# Patient Record
Sex: FEMALE | Race: OTHER | Employment: OTHER | URBAN - METROPOLITAN AREA
[De-identification: names, ages, dates, MRNs, and addresses within clinical notes are randomized per-mention and may not be internally consistent; named-entity substitution may affect disease eponyms.]

---
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
## 2021-01-05 ENCOUNTER — HOSPITAL ENCOUNTER (INPATIENT)
Age: 21
LOS: 1 days | Discharge: PSYCHIATRIC OR SUBSTANCE USE FACILITY | DRG: 817 | End: 2021-01-06
Attending: EMERGENCY MEDICINE | Admitting: INTERNAL MEDICINE
Payer: COMMERCIAL

## 2021-01-05 DIAGNOSIS — T14.91XA SUICIDE ATTEMPT (HCC): Primary | ICD-10-CM

## 2021-01-05 DIAGNOSIS — T43.222A: ICD-10-CM

## 2021-01-05 LAB
ACETAMINOPHEN LEVEL: <5 UG/ML (ref 10–30)
ALBUMIN SERPL-MCNC: 5 G/DL (ref 3.5–4.6)
ALP BLD-CCNC: 95 U/L (ref 40–130)
ALT SERPL-CCNC: 10 U/L (ref 0–33)
ANION GAP SERPL CALCULATED.3IONS-SCNC: 7 MEQ/L (ref 9–15)
APTT: 26.3 SEC (ref 24.4–36.8)
AST SERPL-CCNC: 16 U/L (ref 0–35)
BASOPHILS ABSOLUTE: 0 K/UL (ref 0–0.2)
BASOPHILS RELATIVE PERCENT: 0.5 %
BILIRUB SERPL-MCNC: 0.3 MG/DL (ref 0.2–0.7)
BUN BLDV-MCNC: 11 MG/DL (ref 6–20)
CALCIUM SERPL-MCNC: 9.2 MG/DL (ref 8.5–9.9)
CHLORIDE BLD-SCNC: 98 MEQ/L (ref 95–107)
CO2: 27 MEQ/L (ref 20–31)
CREAT SERPL-MCNC: 0.7 MG/DL (ref 0.5–0.9)
EOSINOPHILS ABSOLUTE: 0.5 K/UL (ref 0–0.7)
EOSINOPHILS RELATIVE PERCENT: 4.8 %
ETHANOL PERCENT: NORMAL G/DL
ETHANOL: <10 MG/DL (ref 0–0.08)
GFR AFRICAN AMERICAN: >60
GFR NON-AFRICAN AMERICAN: >60
GLOBULIN: 2.6 G/DL (ref 2.3–3.5)
GLUCOSE BLD-MCNC: 118 MG/DL (ref 70–99)
HCG QUALITATIVE: NEGATIVE
HCT VFR BLD CALC: 40.1 % (ref 37–47)
HEMOGLOBIN: 12.8 G/DL (ref 12–16)
INR BLD: 1.1
LYMPHOCYTES ABSOLUTE: 3.2 K/UL (ref 1–4.8)
LYMPHOCYTES RELATIVE PERCENT: 33 %
MAGNESIUM: 2 MG/DL (ref 1.7–2.4)
MCH RBC QN AUTO: 26 PG (ref 27–31.3)
MCHC RBC AUTO-ENTMCNC: 32 % (ref 33–37)
MCV RBC AUTO: 81 FL (ref 82–100)
MONOCYTES ABSOLUTE: 0.6 K/UL (ref 0.2–0.8)
MONOCYTES RELATIVE PERCENT: 6.6 %
NEUTROPHILS ABSOLUTE: 5.3 K/UL (ref 1.4–6.5)
NEUTROPHILS RELATIVE PERCENT: 55.1 %
PDW BLD-RTO: 13.3 % (ref 11.5–14.5)
PLATELET # BLD: 173 K/UL (ref 130–400)
POTASSIUM REFLEX MAGNESIUM: 3.9 MEQ/L (ref 3.4–4.9)
PROTHROMBIN TIME: 13.8 SEC (ref 12.3–14.9)
RBC # BLD: 4.94 M/UL (ref 4.2–5.4)
SALICYLATE, SERUM: <0.3 MG/DL (ref 15–30)
SARS-COV-2, NAAT: NOT DETECTED
SODIUM BLD-SCNC: 132 MEQ/L (ref 135–144)
TOTAL PROTEIN: 7.6 G/DL (ref 6.3–8)
TROPONIN: <0.01 NG/ML (ref 0–0.01)
WBC # BLD: 9.5 K/UL (ref 4.5–11)

## 2021-01-05 PROCEDURE — 2580000003 HC RX 258: Performed by: EMERGENCY MEDICINE

## 2021-01-05 PROCEDURE — 6360000002 HC RX W HCPCS: Performed by: EMERGENCY MEDICINE

## 2021-01-05 PROCEDURE — 85610 PROTHROMBIN TIME: CPT

## 2021-01-05 PROCEDURE — 83735 ASSAY OF MAGNESIUM: CPT

## 2021-01-05 PROCEDURE — 96374 THER/PROPH/DIAG INJ IV PUSH: CPT

## 2021-01-05 PROCEDURE — U0002 COVID-19 LAB TEST NON-CDC: HCPCS

## 2021-01-05 PROCEDURE — 84703 CHORIONIC GONADOTROPIN ASSAY: CPT

## 2021-01-05 PROCEDURE — G0480 DRUG TEST DEF 1-7 CLASSES: HCPCS

## 2021-01-05 PROCEDURE — 93005 ELECTROCARDIOGRAM TRACING: CPT | Performed by: EMERGENCY MEDICINE

## 2021-01-05 PROCEDURE — 84484 ASSAY OF TROPONIN QUANT: CPT

## 2021-01-05 PROCEDURE — 85730 THROMBOPLASTIN TIME PARTIAL: CPT

## 2021-01-05 PROCEDURE — 81001 URINALYSIS AUTO W/SCOPE: CPT

## 2021-01-05 PROCEDURE — 80307 DRUG TEST PRSMV CHEM ANLYZR: CPT

## 2021-01-05 PROCEDURE — 85025 COMPLETE CBC W/AUTO DIFF WBC: CPT

## 2021-01-05 PROCEDURE — 6370000000 HC RX 637 (ALT 250 FOR IP): Performed by: EMERGENCY MEDICINE

## 2021-01-05 PROCEDURE — 99285 EMERGENCY DEPT VISIT HI MDM: CPT

## 2021-01-05 PROCEDURE — 36415 COLL VENOUS BLD VENIPUNCTURE: CPT

## 2021-01-05 PROCEDURE — 80053 COMPREHEN METABOLIC PANEL: CPT

## 2021-01-05 RX ORDER — 0.9 % SODIUM CHLORIDE 0.9 %
1000 INTRAVENOUS SOLUTION INTRAVENOUS ONCE
Status: COMPLETED | OUTPATIENT
Start: 2021-01-05 | End: 2021-01-06

## 2021-01-05 RX ORDER — ONDANSETRON 2 MG/ML
4 INJECTION INTRAMUSCULAR; INTRAVENOUS ONCE
Status: COMPLETED | OUTPATIENT
Start: 2021-01-05 | End: 2021-01-05

## 2021-01-05 RX ADMIN — ONDANSETRON 4 MG: 2 INJECTION INTRAMUSCULAR; INTRAVENOUS at 23:08

## 2021-01-05 RX ADMIN — ACTIVATED CHARCOAL 50 G: 208 SUSPENSION ORAL at 23:08

## 2021-01-05 RX ADMIN — SODIUM CHLORIDE 1000 ML: 9 INJECTION, SOLUTION INTRAVENOUS at 23:07

## 2021-01-06 ENCOUNTER — HOSPITAL ENCOUNTER (INPATIENT)
Age: 21
LOS: 6 days | Discharge: HOME | DRG: 751 | End: 2021-01-12
Attending: PSYCHIATRY & NEUROLOGY | Admitting: PSYCHIATRY & NEUROLOGY
Payer: COMMERCIAL

## 2021-01-06 VITALS
RESPIRATION RATE: 18 BRPM | WEIGHT: 91.4 LBS | OXYGEN SATURATION: 99 % | DIASTOLIC BLOOD PRESSURE: 62 MMHG | HEART RATE: 83 BPM | BODY MASS INDEX: 16.2 KG/M2 | HEIGHT: 63 IN | SYSTOLIC BLOOD PRESSURE: 95 MMHG | TEMPERATURE: 98.2 F

## 2021-01-06 PROBLEM — F32.9 MAJOR DEPRESSIVE DISORDER WITHOUT PSYCHOTIC FEATURES: Status: ACTIVE | Noted: 2021-01-06

## 2021-01-06 PROBLEM — T14.91XA SUICIDE ATTEMPT (HCC): Status: ACTIVE | Noted: 2021-01-06

## 2021-01-06 PROBLEM — T43.221A: Status: ACTIVE | Noted: 2021-01-06

## 2021-01-06 LAB
ALBUMIN SERPL-MCNC: 3.6 G/DL (ref 3.5–4.6)
ALP BLD-CCNC: 69 U/L (ref 40–130)
ALT SERPL-CCNC: 16 U/L (ref 0–33)
AMPHETAMINE SCREEN, URINE: NORMAL
ANION GAP SERPL CALCULATED.3IONS-SCNC: 11 MEQ/L (ref 9–15)
AST SERPL-CCNC: 23 U/L (ref 0–35)
BACTERIA: NEGATIVE /HPF
BARBITURATE SCREEN URINE: NORMAL
BENZODIAZEPINE SCREEN, URINE: NORMAL
BILIRUB SERPL-MCNC: 0.3 MG/DL (ref 0.2–0.7)
BILIRUBIN URINE: NEGATIVE
BLOOD, URINE: NEGATIVE
BUN BLDV-MCNC: 7 MG/DL (ref 6–20)
CALCIUM SERPL-MCNC: 8.4 MG/DL (ref 8.5–9.9)
CANNABINOID SCREEN URINE: NORMAL
CHLORIDE BLD-SCNC: 108 MEQ/L (ref 95–107)
CLARITY: CLEAR
CO2: 23 MEQ/L (ref 20–31)
COCAINE METABOLITE SCREEN URINE: NORMAL
COLOR: YELLOW
CREAT SERPL-MCNC: 0.65 MG/DL (ref 0.5–0.9)
EKG ATRIAL RATE: 79 BPM
EKG ATRIAL RATE: 81 BPM
EKG ATRIAL RATE: 86 BPM
EKG P AXIS: 136 DEGREES
EKG P AXIS: 60 DEGREES
EKG P AXIS: 65 DEGREES
EKG P-R INTERVAL: 152 MS
EKG P-R INTERVAL: 152 MS
EKG P-R INTERVAL: 158 MS
EKG Q-T INTERVAL: 370 MS
EKG Q-T INTERVAL: 374 MS
EKG Q-T INTERVAL: 378 MS
EKG QRS DURATION: 72 MS
EKG QRS DURATION: 74 MS
EKG QRS DURATION: 78 MS
EKG QTC CALCULATION (BAZETT): 424 MS
EKG QTC CALCULATION (BAZETT): 439 MS
EKG QTC CALCULATION (BAZETT): 447 MS
EKG R AXIS: 141 DEGREES
EKG R AXIS: 49 DEGREES
EKG R AXIS: 59 DEGREES
EKG T AXIS: 147 DEGREES
EKG T AXIS: 40 DEGREES
EKG T AXIS: 51 DEGREES
EKG VENTRICULAR RATE: 79 BPM
EKG VENTRICULAR RATE: 81 BPM
EKG VENTRICULAR RATE: 86 BPM
EPITHELIAL CELLS, UA: NORMAL /HPF (ref 0–5)
GFR AFRICAN AMERICAN: >60
GFR NON-AFRICAN AMERICAN: >60
GLOBULIN: 2.3 G/DL (ref 2.3–3.5)
GLUCOSE BLD-MCNC: 100 MG/DL (ref 70–99)
GLUCOSE URINE: NEGATIVE MG/DL
HCG, URINE, POC: NEGATIVE
HYALINE CASTS: NORMAL /HPF (ref 0–5)
KETONES, URINE: NEGATIVE MG/DL
LEUKOCYTE ESTERASE, URINE: ABNORMAL
Lab: NORMAL
Lab: NORMAL
METHADONE SCREEN, URINE: NORMAL
NEGATIVE QC PASS/FAIL: NORMAL
NITRITE, URINE: NEGATIVE
OPIATE SCREEN URINE: NORMAL
OXYCODONE URINE: NORMAL
PH UA: 5.5 (ref 5–9)
PHENCYCLIDINE SCREEN URINE: NORMAL
POSITIVE QC PASS/FAIL: NORMAL
POTASSIUM REFLEX MAGNESIUM: 3.9 MEQ/L (ref 3.4–4.9)
PROPOXYPHENE SCREEN: NORMAL
PROTEIN UA: NEGATIVE MG/DL
RBC UA: NORMAL /HPF (ref 0–5)
SODIUM BLD-SCNC: 142 MEQ/L (ref 135–144)
SPECIFIC GRAVITY UA: 1.01 (ref 1–1.03)
TOTAL CK: 83 U/L (ref 0–170)
TOTAL PROTEIN: 5.9 G/DL (ref 6.3–8)
UROBILINOGEN, URINE: 0.2 E.U./DL
WBC UA: NORMAL /HPF (ref 0–5)

## 2021-01-06 PROCEDURE — 1240000000 HC EMOTIONAL WELLNESS R&B

## 2021-01-06 PROCEDURE — 2580000003 HC RX 258: Performed by: NURSE PRACTITIONER

## 2021-01-06 PROCEDURE — 80053 COMPREHEN METABOLIC PANEL: CPT

## 2021-01-06 PROCEDURE — 82550 ASSAY OF CK (CPK): CPT

## 2021-01-06 PROCEDURE — 36415 COLL VENOUS BLD VENIPUNCTURE: CPT

## 2021-01-06 PROCEDURE — 6360000002 HC RX W HCPCS: Performed by: NURSE PRACTITIONER

## 2021-01-06 PROCEDURE — 1210000000 HC MED SURG R&B

## 2021-01-06 PROCEDURE — 90792 PSYCH DIAG EVAL W/MED SRVCS: CPT | Performed by: PSYCHIATRY & NEUROLOGY

## 2021-01-06 PROCEDURE — 93005 ELECTROCARDIOGRAM TRACING: CPT | Performed by: NURSE PRACTITIONER

## 2021-01-06 RX ORDER — ACETAMINOPHEN 325 MG/1
650 TABLET ORAL EVERY 6 HOURS PRN
Status: DISCONTINUED | OUTPATIENT
Start: 2021-01-06 | End: 2021-01-06 | Stop reason: HOSPADM

## 2021-01-06 RX ORDER — MAGNESIUM HYDROXIDE/ALUMINUM HYDROXICE/SIMETHICONE 120; 1200; 1200 MG/30ML; MG/30ML; MG/30ML
30 SUSPENSION ORAL PRN
Status: DISCONTINUED | OUTPATIENT
Start: 2021-01-06 | End: 2021-01-12 | Stop reason: HOSPADM

## 2021-01-06 RX ORDER — HALOPERIDOL 5 MG
5 TABLET ORAL EVERY 4 HOURS PRN
Status: DISCONTINUED | OUTPATIENT
Start: 2021-01-06 | End: 2021-01-12 | Stop reason: HOSPADM

## 2021-01-06 RX ORDER — ONDANSETRON 2 MG/ML
4 INJECTION INTRAMUSCULAR; INTRAVENOUS EVERY 6 HOURS PRN
Status: DISCONTINUED | OUTPATIENT
Start: 2021-01-06 | End: 2021-01-06 | Stop reason: HOSPADM

## 2021-01-06 RX ORDER — TRAZODONE HYDROCHLORIDE 50 MG/1
50 TABLET ORAL NIGHTLY PRN
Status: DISCONTINUED | OUTPATIENT
Start: 2021-01-06 | End: 2021-01-12 | Stop reason: HOSPADM

## 2021-01-06 RX ORDER — PROMETHAZINE HYDROCHLORIDE 12.5 MG/1
12.5 TABLET ORAL EVERY 6 HOURS PRN
Status: DISCONTINUED | OUTPATIENT
Start: 2021-01-06 | End: 2021-01-06 | Stop reason: HOSPADM

## 2021-01-06 RX ORDER — PROMETHAZINE HYDROCHLORIDE 12.5 MG/1
12.5 TABLET ORAL EVERY 6 HOURS PRN
Status: CANCELLED | OUTPATIENT
Start: 2021-01-06

## 2021-01-06 RX ORDER — SODIUM CHLORIDE 0.9 % (FLUSH) 0.9 %
10 SYRINGE (ML) INJECTION PRN
Status: DISCONTINUED | OUTPATIENT
Start: 2021-01-06 | End: 2021-01-06 | Stop reason: HOSPADM

## 2021-01-06 RX ORDER — ACETAMINOPHEN 650 MG/1
650 SUPPOSITORY RECTAL EVERY 6 HOURS PRN
Status: CANCELLED | OUTPATIENT
Start: 2021-01-06

## 2021-01-06 RX ORDER — ONDANSETRON 2 MG/ML
4 INJECTION INTRAMUSCULAR; INTRAVENOUS EVERY 6 HOURS PRN
Status: CANCELLED | OUTPATIENT
Start: 2021-01-06

## 2021-01-06 RX ORDER — SODIUM CHLORIDE 0.9 % (FLUSH) 0.9 %
10 SYRINGE (ML) INJECTION PRN
Status: CANCELLED | OUTPATIENT
Start: 2021-01-06

## 2021-01-06 RX ORDER — SODIUM CHLORIDE 0.9 % (FLUSH) 0.9 %
10 SYRINGE (ML) INJECTION EVERY 12 HOURS SCHEDULED
Status: CANCELLED | OUTPATIENT
Start: 2021-01-06

## 2021-01-06 RX ORDER — ACETAMINOPHEN 650 MG/1
650 SUPPOSITORY RECTAL EVERY 6 HOURS PRN
Status: DISCONTINUED | OUTPATIENT
Start: 2021-01-06 | End: 2021-01-06 | Stop reason: HOSPADM

## 2021-01-06 RX ORDER — HYDROXYZINE HYDROCHLORIDE 25 MG/1
50 TABLET, FILM COATED ORAL 3 TIMES DAILY PRN
Status: DISCONTINUED | OUTPATIENT
Start: 2021-01-06 | End: 2021-01-12 | Stop reason: HOSPADM

## 2021-01-06 RX ORDER — ACETAMINOPHEN 325 MG/1
650 TABLET ORAL EVERY 4 HOURS PRN
Status: DISCONTINUED | OUTPATIENT
Start: 2021-01-06 | End: 2021-01-12 | Stop reason: HOSPADM

## 2021-01-06 RX ORDER — BENZTROPINE MESYLATE 1 MG/ML
2 INJECTION INTRAMUSCULAR; INTRAVENOUS 2 TIMES DAILY PRN
Status: DISCONTINUED | OUTPATIENT
Start: 2021-01-06 | End: 2021-01-12 | Stop reason: HOSPADM

## 2021-01-06 RX ORDER — HALOPERIDOL 5 MG/ML
5 INJECTION INTRAMUSCULAR EVERY 4 HOURS PRN
Status: DISCONTINUED | OUTPATIENT
Start: 2021-01-06 | End: 2021-01-12 | Stop reason: HOSPADM

## 2021-01-06 RX ORDER — ACETAMINOPHEN 325 MG/1
650 TABLET ORAL EVERY 6 HOURS PRN
Status: CANCELLED | OUTPATIENT
Start: 2021-01-06

## 2021-01-06 RX ORDER — SODIUM CHLORIDE 0.9 % (FLUSH) 0.9 %
10 SYRINGE (ML) INJECTION EVERY 12 HOURS SCHEDULED
Status: DISCONTINUED | OUTPATIENT
Start: 2021-01-06 | End: 2021-01-06 | Stop reason: HOSPADM

## 2021-01-06 RX ADMIN — ENOXAPARIN SODIUM 40 MG: 40 INJECTION SUBCUTANEOUS at 10:54

## 2021-01-06 RX ADMIN — Medication 10 ML: at 10:55

## 2021-01-06 ASSESSMENT — ENCOUNTER SYMPTOMS
ABDOMINAL DISTENTION: 0
TROUBLE SWALLOWING: 0
NAUSEA: 0
VOMITING: 0
COUGH: 0
SHORTNESS OF BREATH: 0
SINUS PAIN: 0
SORE THROAT: 0
WHEEZING: 0
ABDOMINAL PAIN: 0
DIARRHEA: 0

## 2021-01-06 ASSESSMENT — SLEEP AND FATIGUE QUESTIONNAIRES
AVERAGE NUMBER OF SLEEP HOURS: 7
RESTFUL SLEEP: NO
DO YOU USE A SLEEP AID: NO

## 2021-01-06 ASSESSMENT — PATIENT HEALTH QUESTIONNAIRE - PHQ9: SUM OF ALL RESPONSES TO PHQ QUESTIONS 1-9: 20

## 2021-01-06 NOTE — PROGRESS NOTES
Physician Progress Note    2021   12:32 PM    Name:  Soila Gonzalez  MRN:    [id number]      Day: 0     Admit Date: 2021 10:40 PM  PCP: Trixie Lagunas MD    Code Status:  Full Code      Assessment and Plan: Active Problems/ diagnosis:     Suicide attempt  Fluoxetine overdose  History of depression    Plan    -Monitor on telemetry today  -Psychiatry to evaluate  -Resume current meds  -Resume one-to-one supervision and suicide precautions  -Anticipate patient will be medically cleared tomorrow morning    DVT PPx     Subjective:     No new symptoms.     Physical Examination:      Vitals:  BP 92/60   Pulse 80   Temp 98.4 °F (36.9 °C) (Oral)   Resp 18   Ht 5' 3\" (1.6 m)   Wt 91 lb 6.4 oz (41.5 kg)   LMP 12/15/2020   SpO2 98%   BMI 16.19 kg/m²   Temp (24hrs), Av °F (36.7 °C), Min:97.5 °F (36.4 °C), Max:98.4 °F (36.9 °C)      General appearance: alert, cooperative and no distress  Mental Status: oriented to person, place and time and normal affect  Lungs: clear to auscultation bilaterally, normal effort  Heart: regular rate and rhythm, no murmur  Abdomen: soft, nontender, nondistended, bowel sounds present, no masses  Extremities: no edema, redness, tenderness in the calves  Skin: no gross lesions, rashes    Data:     Labs:  Recent Labs     21  2300   WBC 9.5   HGB 12.8        Recent Labs     21  2300 21  0450   * 142   K 3.9 3.9   CL 98 108*   CO2 27 23   BUN 11 7   CREATININE 0.70 0.65   GLUCOSE 118* 100*     Recent Labs     21  2300 21  0450   AST 16 23   ALT 10 16   BILITOT 0.3 0.3   ALKPHOS 95 69       Current Facility-Administered Medications   Medication Dose Route Frequency Provider Last Rate Last Admin    sodium chloride flush 0.9 % injection 10 mL  10 mL Intravenous 2 times per day AUDI Combs - CNP   10 mL at 21 1055    sodium chloride flush 0.9 % injection 10 mL  10 mL Intravenous PRN Emely Riddlews, APRN - CNP        enoxaparin (LOVENOX) injection 40 mg  40 mg Subcutaneous Daily Cleophus Reason, APRN - CNP   40 mg at 01/06/21 1054    promethazine (PHENERGAN) tablet 12.5 mg  12.5 mg Oral Q6H PRN Cleophus Reason, APRN - CNP        Or    ondansetron [facility]) injection 4 mg  4 mg Intravenous Q6H PRN Cleophus Reason, APRN - CNP        acetaminophen (TYLENOL) tablet 650 mg  650 mg Oral Q6H PRN Cleophus Reason, APRN - CNP        Or    acetaminophen (TYLENOL) suppository 650 mg  650 mg Rectal Q6H PRN Cleophus Reason, APRN - CNP           Additional work up or/and treatment plan may be added today or then after based on clinical progression. I am managing a portion of pt care. Some medical issues are handled by other specialists. Additional work up and treatment should be done in out pt setting by pt PCP and other out pt providers. In addition to examining and evaluating pt, I spent additional time explaining care, normaland abnormal findings, and treatment plan. All of pt questions were answered. Counseling, diet and education were provided. Case will be discussed with nursing staff when appropriate. Family will be updated if and when appropriate.        Electronically signed by Josh Healy DO on 1/6/2021 at 12:32 PM

## 2021-01-06 NOTE — H&P
[facility]  MEDICINE    HISTORY AND PHYSICAL EXAM    PATIENT NAME:  Kevin Valdes    MRN:  [id number]  SERVICE DATE:  1/6/2021   SERVICE TIME:  12:50 AM    Primary Care Physician: Kathy Maddox MD         SUBJECTIVE  CHIEF COMPLAINT:  Suicide attempt     HPI:  This is a 21 y.o. female w no signiicant medical history except depression/anxiety  who presents with after taking handful of fluoxetine. She states she and her boyfriend broke up, so she took the pills in an attempt to kill herself. She denies HA, blurred vision, fever, N/V or abdominal pain. She is alert, oriented. She denies seizure, palpitations, trouble breathing,     PAST MEDICAL HISTORY:    Past Medical History:   Diagnosis Date    Anxiety     Depression      PAST SURGICAL HISTORY:  History reviewed. No pertinent surgical history. FAMILY HISTORY:  History reviewed. No pertinent family history.   SOCIAL HISTORY:    Social History     Socioeconomic History    Marital status: Single     Spouse name: Not on file    Number of children: Not on file    Years of education: Not on file    Highest education level: Not on file   Occupational History    Not on file   Social Needs    Financial resource strain: Not on file    Food insecurity     Worry: Not on file     Inability: Not on file    Transportation needs     Medical: Not on file     Non-medical: Not on file   Tobacco Use    Smoking status: Never Smoker    Smokeless tobacco: Never Used   Substance and Sexual Activity    Alcohol use: No    Drug use: No    Sexual activity: Not on file   Lifestyle    Physical activity     Days per week: Not on file     Minutes per session: Not on file    Stress: Not on file   Relationships    Social connections     Talks on phone: Not on file     Gets together: Not on file     Attends Mosque service: Not on file     Active member of club or organization: Not on file     Attends meetings of clubs or organizations: Not on file nervous/anxious. OBJECTIVE  PHYSICAL EXAM: BP (!) 95/54   Pulse 96   Temp 97.5 °F (36.4 °C) (Oral)   Resp 14   Wt 90 lb (40.8 kg)   LMP 12/15/2020   SpO2 98%   BMI 15.94 kg/m²     Physical Exam  Constitutional:       General: She is not in acute distress. Appearance: She is normal weight. She is not ill-appearing. HENT:      Head: Normocephalic. Nose: Nose normal.      Mouth/Throat:      Mouth: Mucous membranes are moist.   Eyes:      Pupils: Pupils are equal, round, and reactive to light. Neck:      Musculoskeletal: No muscular tenderness. Cardiovascular:      Rate and Rhythm: Normal rate and regular rhythm. Pulses: Normal pulses. Heart sounds: No murmur. Pulmonary:      Effort: Pulmonary effort is normal.      Breath sounds: Normal breath sounds. No wheezing. Abdominal:      Palpations: Abdomen is soft. Tenderness: There is no abdominal tenderness. Musculoskeletal:      Right lower leg: No edema. Left lower leg: No edema. Lymphadenopathy:      Cervical: Cervical adenopathy present. Skin:     General: Skin is warm and dry. Capillary Refill: Capillary refill takes less than 2 seconds. Coloration: Skin is not pale. Findings: No rash. Neurological:      Mental Status: She is alert and oriented to person, place, and time. Psychiatric:      Comments: Flat affect  Poor eye contact         DATA:     Diagnostic tests reviewed for today's visit:    Most recent labs and imaging results reviewed.      LABS:    Recent Results (from the past 24 hour(s))   CBC Auto Differential    Collection Time: 01/05/21 11:00 PM   Result Value Ref Range    WBC 9.5 4.5 - 11.0 K/uL    RBC 4.94 4.20 - 5.40 M/uL    Hemoglobin 12.8 12.0 - 16.0 g/dL    Hematocrit 40.1 37.0 - 47.0 %    MCV 81.0 (L) 82.0 - 100.0 fL    MCH 26.0 (L) 27.0 - 31.3 pg    MCHC 32.0 (L) 33.0 - 37.0 %    RDW 13.3 11.5 - 14.5 %    Platelets 424 845 - 390 K/uL    Neutrophils % 55.1 %    Lymphocytes % 33.0 %    Monocytes % 6.6 %    Eosinophils % 4.8 %    Basophils % 0.5 %    Neutrophils Absolute 5.3 1.4 - 6.5 K/uL    Lymphocytes Absolute 3.2 1.0 - 4.8 K/uL    Monocytes Absolute 0.6 0.2 - 0.8 K/uL    Eosinophils Absolute 0.5 0.0 - 0.7 K/uL    Basophils Absolute 0.0 0.0 - 0.2 K/uL   Comprehensive Metabolic Panel w/ Reflex to MG    Collection Time: 01/05/21 11:00 PM   Result Value Ref Range    Sodium 132 (L) 135 - 144 mEq/L    Potassium reflex Magnesium 3.9 3.4 - 4.9 mEq/L    Chloride 98 95 - 107 mEq/L    CO2 27 20 - 31 mEq/L    Anion Gap 7 (L) 9 - 15 mEq/L    Glucose 118 (H) 70 - 99 mg/dL    BUN 11 6 - 20 mg/dL    CREATININE 0.70 0.50 - 0.90 mg/dL    GFR Non-African American >60.0 >60    GFR  >60.0 >60    Calcium 9.2 8.5 - 9.9 mg/dL    Total Protein 7.6 6.3 - 8.0 g/dL    Alb 5.0 (H) 3.5 - 4.6 g/dL    Total Bilirubin 0.3 0.2 - 0.7 mg/dL    Alkaline Phosphatase 95 40 - 130 U/L    ALT 10 0 - 33 U/L    AST 16 0 - 35 U/L    Globulin 2.6 2.3 - 3.5 g/dL   Troponin    Collection Time: 01/05/21 11:00 PM   Result Value Ref Range    Troponin <0.010 0.000 - 0.010 ng/mL   Protime-INR    Collection Time: 01/05/21 11:00 PM   Result Value Ref Range    Protime 13.8 12.3 - 14.9 sec    INR 1.1    APTT    Collection Time: 01/05/21 11:00 PM   Result Value Ref Range    aPTT 26.3 24.4 - 36.8 sec   Urinalysis, reflex to microscopic    Collection Time: 01/05/21 11:00 PM   Result Value Ref Range    Color, UA Yellow Straw/Yellow    Clarity, UA Clear Clear    Glucose, Ur Negative Negative mg/dL    Bilirubin Urine Negative Negative    Ketones, Urine Negative Negative mg/dL    Specific Gravity, UA 1.011 1.005 - 1.030    Blood, Urine Negative Negative    pH, UA 5.5 5.0 - 9.0    Protein, UA Negative Negative mg/dL    Urobilinogen, Urine 0.2 <2.0 E.U./dL    Nitrite, Urine Negative Negative    Leukocyte Esterase, Urine TRACE (A) Negative   Magnesium    Collection Time: 01/05/21 11:00 PM   Result Value Ref Range    Magnesium 2.0 1.7 - 2.4 mg/dL   HCG Qualitative, Serum    Collection Time: 01/05/21 11:00 PM   Result Value Ref Range    hCG Qual Negative    Acetaminophen Level    Collection Time: 01/05/21 11:00 PM   Result Value Ref Range    Acetaminophen Level <5 (L) 10 - 30 ug/mL   Salicylate    Collection Time: 01/05/21 11:00 PM   Result Value Ref Range    Salicylate, Serum <6.7 (L) 15.0 - 30.0 mg/dL   Urine Drug Screen    Collection Time: 01/05/21 11:00 PM   Result Value Ref Range    Amphetamine Screen, Urine Neg Negative <1000 ng/mL    Barbiturate Screen, Ur Neg Negative < 200 ng/mL    Benzodiazepine Screen, Urine Neg Negative < 200 ng/mL    Cannabinoid Scrn, Ur Neg Negative < 50 ng/mL    Cocaine Metabolite Screen, Urine Neg Negative < 300 ng/mL    Opiate Scrn, Ur Neg Negative < 300 ng/mL    PCP Screen, Urine Neg Negative < 25 ng/mL    Methadone Screen, Urine Neg Negative <300 ng/mL    Propoxyphene Scrn, Ur Neg Negative <300 ng/mL    Oxycodone Urine Neg Negative <100 ng/mL    Drug Screen Comment: see below    Ethanol    Collection Time: 01/05/21 11:00 PM   Result Value Ref Range    Ethanol Lvl <10 mg/dL    Ethanol percent Not indicated G/dL   Microscopic Urinalysis    Collection Time: 01/05/21 11:00 PM   Result Value Ref Range    Bacteria, UA Negative Negative /HPF    Hyaline Casts, UA 0-1 0 - 5 /HPF    WBC, UA 3-5 0 - 5 /HPF    RBC, UA 0-2 0 - 5 /HPF    Epithelial Cells, UA 0-2 0 - 5 /HPF   COVID-19    Collection Time: 01/05/21 11:23 PM   Result Value Ref Range    SARS-CoV-2, NAAT Not Detected Not Detected   POC PREGNANCY UR-QUAL    Collection Time: 01/06/21 12:00 AM   Result Value Ref Range    HCG, Urine, POC Negative Negative    Lot Number [id number]     Positive QC Pass/Fail Pass     Negative QC Pass/Fail Pass        IMAGING:  No results found. ASSESSMENT AND PLAN  Principal Problem:    Suicide attempt   \"handful\" fluoxetine. After break up w/ boyfriend. She has flat affect, she is cooperative.   Past depression/anxiety - no meds at home. Plan: admit  Psych consult.  suicide precautions         Fluoxetine hydrochloride poisoning  She is unsure how many she took   She is alert  Oriented  No seizure activity  PERRLA. Was given activated charcoal w/ no result at this point. Her VSs are normal.  EKG NSR  QTc 439    Plan:   Admit  Telemetry    q 4 hour EKG to monitor QTc,  Seizure precautions, monitor for s/s serotonin syndrome.          Plan of care discussed with: patient    SIGNATURE: AUDI Bowden - CNP  DATE: January 6, 2021  TIME: 12:50 AM     Lori Salguero MD - supervising physician

## 2021-01-06 NOTE — ED NOTES
Pt continues on cart with eyes open. resp even. No distress noted. Will continue to monitor.      Maco Landis RN  01/05/21 6984

## 2021-01-06 NOTE — DISCHARGE INSTR - COC
Continuity of Care Form    Patient Name: Dileep Rich   :  2000  MRN:  [id number]    Admit date:  2021  Discharge date:  2020    Code Status Order: Full Code   Advance Directives:     Admitting Physician: Gisella Thornton MD  PCP: Lenny Rodriguez MD    Discharging Nurse: PD  [address] Unit/Room#: M557/R077-01  Discharging Unit Phone Number: [phone]    Emergency Contact:   Extended Emergency Contact Information  Primary Emergency Contact: Elisa Al  Address: [address] [place] [address] Phone: [phone]  Relation: Parent   needed? No    Past Surgical History:  History reviewed. No pertinent surgical history. Immunization History: There is no immunization history on file for this patient.     Active Problems:  Patient Active Problem List   Diagnosis Code    Suicide attempt ([facility] [address] T14.91XA    Fluoxetine hydrochloride poisoning T43.221A       Isolation/Infection:   Isolation          No Isolation        Patient Infection Status     Infection Onset Added Last Indicated Last Indicated By Review Planned Expiration Resolved Resolved By    None active    Resolved    COVID-19 Rule Out 21 COVID-19 (Ordered)   21 Rule-Out Test Resulted          Nurse Assessment:  Last Vital Signs: BP 95/62   Pulse 83   Temp 98.2 °F (36.8 °C) (Oral)   Resp 18   Ht 5' 3\" (1.6 m)   Wt 91 lb 6.4 oz (41.5 kg)   LMP 12/15/2020   SpO2 99%   BMI 16.19 kg/m²     Last documented pain score (0-10 scale): Pain Level: 0  Last Weight:   Wt Readings from Last 1 Encounters:   21 91 lb 6.4 oz (41.5 kg)     Mental Status:  oriented, alert, coherent, logical, thought processes intact and able to concentrate and follow conversation    IV Access:  - None    Nursing Mobility/ADLs:  Walking   Independent  Transfer  Independent  Bathing  Independent  Dressing  Independent  [address]  Feeding  Independent  Med Admin  Assisted  Med Delivery   whole    Wound Care Documentation and Therapy:        Elimination:  Continence:   · Bowel: Yes  · Bladder: Yes  Urinary Catheter: None   Colostomy/Ileostomy/Ileal Conduit: No       Date of Last BM:     Intake/Output Summary (Last 24 hours) at 1/6/2021 1812  Last data filed at 1/6/2021 1300  Gross per 24 hour   Intake 1195 ml   Output --   Net 1195 ml     I/O last 3 completed shifts: In: 1479 [P.O.:195; IV Piggyback:1000]  Out: -     Safety Concerns:     None    Impairments/Disabilities:      None    Nutrition Therapy:  Current Nutrition Therapy:   - Oral Diet:  General    Routes of Feeding: Oral  Liquids: Thin Liquids  Daily Fluid Restriction: no  Last Modified Barium Swallow with Video (Video Swallowing Test): not done    Treatments at the Time of Hospital Discharge:   Respiratory Treatments: none  Oxygen Therapy:  is not on home oxygen therapy.   Ventilator:    - No ventilator support    Rehab Therapies: {THERAPEUTIC INTERVENTION:8327998135}  Weight Bearing Status/Restrictions: No weight bearing restirctions  Other Medical Equipment (for information only, NOT a DME order):  {EQUIPMENT:632985202}  Other Treatments:     Patient's personal belongings (please select all that are sent with patient):  Lauren condon    RN SIGNATURE:  Electronically signed by Erasmo Montalvo RN on 1/6/21 at 6:14 PM EST    CASE MANAGEMENT/SOCIAL WORK SECTION    Inpatient Status Date: ***    Readmission Risk Assessment Score:  Readmission Risk              Risk of Unplanned Readmission:        7           Discharging to Facility/ Agency   · Name:   · Address:  · Phone:  · Fax:    Dialysis Facility (if applicable)   · Name:  · Address:  · Dialysis Schedule:  · Phone:  · Fax:    / signature: {Esignature:[id number]}    PHYSICIAN SECTION    Prognosis: {Prognosis:0710252558}    Condition at Discharge: 50Carlos Low Patient Condition:270580293}    Rehab Potential (if transferring to Rehab): {Prognosis:6747563869}    Recommended Labs or Other Treatments After Discharge: ***    Physician Certification: I certify the above information and transfer of Conception Ruffin  is necessary for the continuing treatment of the diagnosis listed and that she requires {Admit to Appropriate Level of Care:09339} for {GREATER/LESS:864180078} 30 days.      Update Admission H&P: {CHP DME Changes in FKZKP:328972428}    PHYSICIAN SIGNATURE:  {Esignature:[id number]}

## 2021-01-06 NOTE — CARE COORDINATION
PATIENT PINK SLIPPED FOR SUICIDE ATTEMPT. PER  PATIENT NEEDS TO BE MONITORED TILL TOMORROW FOR ANY REACTIONS TO THE OD OF MEDICATION. PSYCH EVAL IS ORDERED. PENDING TO SEE IF PATIENT WILL REQUIRE A INPATIENT PSYCH ADMISSION. CM TO FOLLOW.

## 2021-01-06 NOTE — BH NOTE
Pt seen, D/W team    S/P overdose attempt  Clinically depressed  Will need admit to [facility] when medically stable  Continue sitter    Electronically signed by Kathleen Barlow MD on 1/6/2021 at 2:20 PM

## 2021-01-06 NOTE — CARE COORDINATION
PATIENT WILL BE MEDICALLY CLEARED FOR ADMISSION TO INPATIENT PSYCH AFTER 5 PM PER MEDICAL PHYSICIAN. WILL NEED D/C READMIT ORDERS. CALL PLACED TO [facility] [place] THEY STATED THAT THEY WILL NEED SEVERAL ITEMS SENT TO [facility], LIST GIVEN TO NURSING. THEY ALSO WILL NEED ORDERS FROM BOTH MEDICAL PHYSICIAN AND THE PSYCHIATRIST. MESSAGE SENT TO DR. OH AND HE STATES [facility] HE CANNOT PUT HIS ORDERS IN UNTIL D/C ORDERS ARE IN THE COMPUTER. UPDATED NURSING ON THE D/C PLAN.

## 2021-01-06 NOTE — CONSULTS
Take 1 tablet by mouth 3 times daily as needed for Pain (bladder spasm/pain)  [DISCONTINUED] ibuprofen (ADVIL;MOTRIN) 600 MG tablet, Take 1 tablet by mouth every 8 hours as needed for Pain  [DISCONTINUED] famotidine (PEPCID) 20 MG tablet, Take 1 tablet by mouth 2 times daily  [DISCONTINUED] ondansetron (ZOFRAN-ODT) 4 MG disintegrating tablet, Take 1 tablet by mouth 3 times daily as needed for Nausea or Vomiting  [DISCONTINUED] cephALEXin (KEFLEX) 500 MG capsule, Take 1 capsule by mouth 3 times daily    Allergies:  Patient has no known allergies. FAMILY/SOCIAL HISTORY:  History reviewed. No pertinent family history.   Social History     Socioeconomic History    Marital status: Single     Spouse name: Not on file    Number of children: Not on file    Years of education: Not on file    Highest education level: Not on file   Occupational History    Not on file   Social Needs    Financial resource strain: Not on file    Food insecurity     Worry: Not on file     Inability: Not on file    Transportation needs     Medical: Not on file     Non-medical: Not on file   Tobacco Use    Smoking status: Never Smoker    Smokeless tobacco: Never Used   Substance and Sexual Activity    Alcohol use: No    Drug use: No    Sexual activity: Not on file   Lifestyle    Physical activity     Days per week: Not on file     Minutes per session: Not on file    Stress: Not on file   Relationships    Social connections     Talks on phone: Not on file     Gets together: Not on file     Attends Pentecostal service: Not on file     Active member of club or organization: Not on file     Attends meetings of clubs or organizations: Not on file     Relationship status: Not on file    Intimate partner violence     Fear of current or ex partner: Not on file     Emotionally abused: Not on file     Physically abused: Not on file     Forced sexual activity: Not on file   Other Topics Concern    Not on file   Social History Narrative    Not on file       REVIEW OF SYSTEMS    Constitutional: [] fever  [] chills  [] weight loss  []weakness [] Other:  Eyes:  [] photophobia  [] discharge [] acuity change   [] Diplopia   [] Other:  HENT:  [] sore throat  [] ear pain [] Tinnitus   [] Other  Respiratory:  [] Cough  [] Shortness of breath   [] Sputum   [] Other:   Cardiac: []Chest pain   []Palpitations []Edema  []PND  [] Other:  GI:  []Abdominal pain   []Nausea  []Vomiting  []Diarrhea  [] Other:  :  [] Dysuria   []Frequency  []Hematuria  []Discharge  [] Other:  Possible Pregnancy: []Yes   []No   LMP:   Musculoskeletal:  []Back pain  []Neck pain  []Recent Injury   Skin:  []Rash  [] Itching  [] Other:  Neurologic:  [] Headache  [] Focal weakness  [] Sensory changes []Other:  Endocrine:  [] Polyuria  [] Polydipsia  [] Hair Loss  [] Other:  Lymphatic:   [] Swollen glands   Psychiatric:  As per HPI      All other systems negative except as marked or mentioned/indicated in the HPI. Lysbeth Carrel      PHYSICAL EXAM:  Vitals:  BP 92/60   Pulse 80   Temp 98.4 °F (36.9 °C) (Oral)   Resp 18   Ht 5' 3\" (1.6 m)   Wt 91 lb 6.4 oz (41.5 kg)   LMP 12/15/2020   SpO2 98%   BMI 16.19 kg/m²      Neuro Exam:   Muscle Strength & Tone: full ROM  Gait: normal gait   Involuntary Movements: No    Mental Status Examination:    Level of consciousness:  within normal limits   Appearance:  ill-appearing  Behavior/Motor:  psychomotor retardation  Attitude toward examiner:  cooperative  Speech:  slow   Mood: anxious, decreased range and depressed  Affect:  blunted  Thought processes:  slow   Thought content:  Suicidal Ideation:  passive  Delusions:  no evidence of delusions  Perceptual Disturbance:  denies any perceptual disturbance  Cognition:  oriented to person, place, and time   Concentration distractible  Memory intact  Insight poor   Judgement poor   Fund of Knowledge limited     Mini Mental Status 30/30        DIAGNOSIS:      MDD single episode severe   S/P Serious overdose MARIEL           RISK ASSESSMENT:     SUICIDE RISK ASSESSMENT: high  HOMICIDE: low  AGITATION/VIOLENCE: low  ELOPEMENT: low       LABS: REVIEWED TODAY:  Recent Labs     01/05/21  2300   WBC 9.5   HGB 12.8        Recent Labs     01/05/21 2300 01/06/21  0450   * 142   K 3.9 3.9   CL 98 108*   CO2 27 23   BUN 11 7   CREATININE 0.70 0.65   GLUCOSE 118* 100*     Recent Labs     01/05/21 2300 01/06/21  0450   BILITOT 0.3 0.3   ALKPHOS 95 69   AST 16 23   ALT 10 16     Lab Results   Component Value Date    LABAMPH Neg 01/05/2021    BARBSCNU Neg 01/05/2021    LABBENZ Neg 01/05/2021    LABMETH Neg 01/05/2021    OPIATESCREENURINE Neg 01/05/2021    PHENCYCLIDINESCREENURINE Neg 01/05/2021    ETOH <10 01/05/2021     No results found for: TSH, FREET4  No results found for: LITHIUM  No results found for: VALPROATE, CBMZ  No results found for: LITHIUM, VALPROATE    FURTHER LABS ORDERED :      Radiology   No results found. EKG: TRACING REVIEWED    RECOMMENDATIONS    Risk Management:  close watch and suicide risk    Patient is currently at high suicidal risk  Please transfer the patient to 1 [address]. when medically stable for further assessment and treatment  One-to-one sitter to be continued  Discussed with the treating physician/ team about the patient and treatment plan  Reviewed the chart    Discussed with the patient risk, benefit, alternative and common side effects for the  proposed medication treatment. Patient is consenting to the treatment. Thanks for the consult. Please call me if needed.     Electronically signed by Bowen Holman MD on 1/6/2021 at 2:20 PM

## 2021-01-06 NOTE — DISCHARGE SUMMARY
Hospital Medicine Discharge Summary    Leanna Prieto  :  2000  MRN:  [id number]    Admit date:  2021  Discharge date:  2021    Admitting Physician: Juan Rivas MD  Primary Care Physician:  Yariel Lawrence MD      Discharge Diagnoses:    ***    Chief Complaint   Patient presents with   [facility]  [place] [id number] Course:         ***    Exam on discharge: ***  BP 95/62   Pulse 83   Temp 98.2 °F (36.8 °C) (Oral)   Resp 18   Ht 5' 3\" (1.6 m)   Wt 91 lb 6.4 oz (41.5 kg)   LMP 12/15/2020   SpO2 99%   BMI 16.19 kg/m²   General appearance: No apparent distress, appears stated age and cooperative. HEENT: Pupils equal, round, and reactive to light. Conjunctivae/corneas clear. Neck: Supple, with full range of motion. No jugular venous distention. Trachea midline. Respiratory:  Normal respiratory effort. Clear to auscultation, bilaterally without Rales/Wheezes/Rhonchi. Cardiovascular: Regular rate and rhythm with normal S1/S2 without murmurs, rubs or gallops. Abdomen: Soft, non-tender, non-distended with normal bowel sounds. Musculoskeletal: No clubbing, cyanosis or edema bilaterally. Full range of motion without deformity. Skin: Skin color, texture, turgor normal.  No rashes or lesions. Neuro: Non Focal. Symetrical motor and tone. Nl Comprehension, Alert,awake and oriented. NL CN. Symetrical tone and reflexes. Psychiatric: Alert and oriented, thought content appropriate, normal insight  Capillary Refill: Brisk,< 3 seconds   Peripheral Pulses: +2 palpable, equal bilaterally     Patient was seen by the following consultants while admitted to [facility]:   Consults:  [address]    Significant Diagnostic Studies:    Refer to chart     Please refer to chart if no studies are shown here    No results found. Discharge Medications: There are no discharge medications for this patient.        Disposition: If discharged to Home, [place] [address] needs that were indicated and/or required as been addressed and set up by Social Work. Condition at discharge: good ***    Activity: {discharge activity:18136}    Total time taken for discharging this patient: 40 minutes. Greater than 70% of time was spent focused exclusively on this patient. Time was taken to review chart, discuss plans with consultants, reconciling medications, discussing plan answering questions with patient.      Kade Jorge  1/6/2021, 5:04 PM  ----------------------------------------------------------------------------------------------------------------------    Megan Gibson

## 2021-01-06 NOTE — ED PROVIDER NOTES
Alcohol use: No    Drug use: No    Sexual activity: None   Lifestyle    Physical activity     Days per week: None     Minutes per session: None    Stress: None   Relationships    Social connections     Talks on phone: None     Gets together: None     Attends Moravian service: None     Active member of club or organization: None     Attends meetings of clubs or organizations: None     Relationship status: None    Intimate partner violence     Fear of current or ex partner: None     Emotionally abused: None     Physically abused: None     Forced sexual activity: None   Other Topics Concern    None   Social History Narrative    None       REVIEW OF SYSTEMS    Constitutional:  Denies fever, chills, weight loss or weakness   Eyes:  Denies photophobia or discharge   HENT:  Denies sore throat or ear pain   Respiratory:  Denies cough or shortness of breath   Cardiovascular:  Denies chest pain, palpitations or swelling   GI:  Denies abdominal pain, nausea, vomiting, or diarrhea   Musculoskeletal:  Denies back pain   Skin:  Denies rash   Neurologic:  Denies headache, focal weakness or sensory changes   Endocrine:  Denies polyuria or polydypsia   Lymphatic:  Denies swollen glands   Psychiatric: c/o depression, and suicidal ideation but denies homicidal ideation   All systems negative except as marked. PHYSICAL EXAM    VITAL SIGNS: BP (!) 98/52   Pulse 93   Temp 97.5 °F (36.4 °C) (Oral)   Resp 14   Wt 90 lb (40.8 kg)   LMP 12/15/2020   SpO2 100%   BMI 15.94 kg/m²    Constitutional:  Well developed, Well nourished, No acute distress, Non-toxic appearance. HENT:  Normocephalic, Atraumatic, Bilateral external ears normal, Oropharynx moist, No oral exudates, Nose normal. Neck- Normal range of motion, No tenderness, Supple, No stridor. Eyes:  PERRL, EOMI, Conjunctiva normal, No discharge. Respiratory:  Normal breath sounds, No respiratory distress, No wheezing, No chest tenderness.    Cardiovascular:  Normal heart rate, Normal rhythm, No murmurs, No rubs, No gallops. GI:  Bowel sounds normal, Soft, No tenderness, No masses, No pulsatile masses. :  No CVA tenderness. Musculoskeletal:  Intact distal pulses, No edema, No tenderness, No cyanosis, No clubbing. Good range of motion in all major joints. No tenderness to palpation or major deformities noted. Back- No tenderness. Integument:  Warm, Dry, No erythema, No rash. Lymphatic:  No lymphadenopathy noted. Neurologic:  Alert & oriented x 3, Normal motor function, Normal sensory function, No focal deficits noted. Psychiatric:  Affect normal, Judgment poor, Mood depressed. EKG    NSR, HR 81 , Normal Axis, No ST- T wave changes, QTc 439    RADIOLOGY    No orders to display       REEVALUATION   Spoke with Poison Control recommend Activated charcoal , observation, QTc monitoring, Seizures , Serotonin Syndrome     I have personally provided  30  minutes of critical care time exclusive of time spent on separately billable procedures. Time includes review of laboratory data, radiology results, discussion with consultants, and monitoring for potential decompensation. Interventions were performed as documented above.     Spoke with hospitalist  accepted admission     Spoke with Poison Control and updated Labs and status    Labs  Labs Reviewed   CBC WITH AUTO DIFFERENTIAL - Abnormal; Notable for the following components:       Result Value    MCV 81.0 (*)     MCH 26.0 (*)     MCHC 32.0 (*)     All other components within normal limits   COMPREHENSIVE METABOLIC PANEL W/ REFLEX TO MG FOR LOW K - Abnormal; Notable for the following components:    Sodium 132 (*)     Anion Gap 7 (*)     Glucose 118 (*)     Alb 5.0 (*)     All other components within normal limits   URINALYSIS - Abnormal; Notable for the following components:    Leukocyte Esterase, Urine TRACE (*)     All other components within normal limits   ACETAMINOPHEN LEVEL - Abnormal; Notable for the following components:    Acetaminophen Level <5 (*)     All other components within normal limits   SALICYLATE LEVEL - Abnormal; Notable for the following components:    Salicylate, Serum <8.5 (*)     All other components within normal limits   TROPONIN   PROTIME-INR   APTT   MAGNESIUM   HCG, SERUM, QUALITATIVE   ETHANOL   COVID-19   URINE DRUG SCREEN   MICROSCOPIC URINALYSIS   POC PREGNANCY UR-QUAL             Summation      Patient Course:     ED Medications administered this visit:    Medications   0.9 % sodium chloride bolus (1,000 mLs Intravenous New Bag 1/5/21 2307)   charcoal activated liquid 50 g (50 g Oral Given 1/5/21 2308)   ondansetron (ZOFRAN) injection 4 mg (4 mg Intravenous Given 1/5/21 2308)       New Prescriptions from this visit:    New Prescriptions    No medications on file       Follow-up:  No follow-up provider specified. Final Impression:   1. Suicide attempt ([address]    2.  Fluoxetine hydrochloride poisoning, intentional self-harm, initial encounter [facility])               (Please note that portions of this note were completed with a voice recognition program.  Efforts were made to edit the dictations but occasionally words are mis-transcribed.)          Js Malone MD  01/06/21 0041

## 2021-01-07 LAB — TSH SERPL DL<=0.05 MIU/L-ACNC: 1.07 UIU/ML (ref 0.44–3.86)

## 2021-01-07 PROCEDURE — 6370000000 HC RX 637 (ALT 250 FOR IP): Performed by: PSYCHIATRY & NEUROLOGY

## 2021-01-07 PROCEDURE — 36415 COLL VENOUS BLD VENIPUNCTURE: CPT

## 2021-01-07 PROCEDURE — 99223 1ST HOSP IP/OBS HIGH 75: CPT | Performed by: PSYCHIATRY & NEUROLOGY

## 2021-01-07 PROCEDURE — 1240000000 HC EMOTIONAL WELLNESS R&B

## 2021-01-07 PROCEDURE — 93010 ELECTROCARDIOGRAM REPORT: CPT | Performed by: INTERNAL MEDICINE

## 2021-01-07 PROCEDURE — 84443 ASSAY THYROID STIM HORMONE: CPT

## 2021-01-07 RX ORDER — SERTRALINE HYDROCHLORIDE 25 MG/1
25 TABLET, FILM COATED ORAL DAILY
Status: DISCONTINUED | OUTPATIENT
Start: 2021-01-07 | End: 2021-01-11

## 2021-01-07 RX ORDER — FLUOXETINE 10 MG/1
10 CAPSULE ORAL DAILY
Status: ON HOLD | COMMUNITY
End: 2021-01-12 | Stop reason: HOSPADM

## 2021-01-07 RX ADMIN — SERTRALINE HYDROCHLORIDE 25 MG: 25 TABLET ORAL at 11:01

## 2021-01-07 NOTE — CONSULTS
[address] MEDICINE    HISTORY AND PHYSICAL EXAM    PATIENT NAME:  Jez Griffith    MRN:  [id number]  SERVICE DATE:  1/7/2021   SERVICE TIME:  8:29 AM    Primary Care Physician: Christal Lowry MD         SUBJECTIVE  CHIEF COMPLAINT:  Medically appropriate for inpatient psychiatry admission. Consult for medical H/P encounter. HPI:  This is a 21 y.o. female who presents with pmhx depression and anxiety. Admitted from hospital after brief stay for overdose of a \"handful\" of fluoxetine. EKG was monitored with no change in rhythm. Patient cleared  for psychiatric care. Patient Seen, Chart, Labs, Radiologystudies, and Consults reviewed. Patient denies headache, chest pain, shortness of breath, N/V/D/C, fever/chills. Tearful and sad during exam. Wants to go home. Emotional support provided      PAST MEDICAL HISTORY:    Past Medical History:   Diagnosis Date    Anxiety     Depression      PAST SURGICAL HISTORY:  No past surgical history on file. FAMILY HISTORY:  No family history on file.   SOCIAL HISTORY:    Social History     Socioeconomic History    Marital status: Single     Spouse name: Not on file    Number of children: Not on file    Years of education: Not on file    Highest education level: Not on file   Occupational History    Not on file   Social Needs    Financial resource strain: Not on file    Food insecurity     Worry: Not on file     Inability: Not on file    Transportation needs     Medical: Not on file     Non-medical: Not on file   Tobacco Use    Smoking status: Never Smoker    Smokeless tobacco: Never Used   Substance and Sexual Activity    Alcohol use: No    Drug use: No    Sexual activity: Not on file   Lifestyle    Physical activity     Days per week: Not on file     Minutes per session: Not on file    Stress: Not on file   Relationships    Social connections     Talks on phone: Not on file     Gets together: Not on file Attends Tenriism service: Not on file     Active member of club or organization: Not on file     Attends meetings of clubs or organizations: Not on file     Relationship status: Not on file    Intimate partner violence     Fear of current or ex partner: Not on file     Emotionally abused: Not on file     Physically abused: Not on file     Forced sexual activity: Not on file   Other Topics Concern    Not on file   Social History Narrative    Not on file     MEDICATIONS:    Current Facility-Administered Medications   Medication Dose Route Frequency Provider Last Rate Last Admin    acetaminophen (TYLENOL) tablet 650 mg  650 mg Oral Q4H PRN Nati Haney MD        magnesium hydroxide (MILK OF MAGNESIA) 400 MG/5ML suspension 30 mL  30 mL Oral Daily PRN Nati Haney MD        aluminum & magnesium hydroxide-simethicone (MAALOX) 200-200-20 MG/5ML suspension 30 mL  30 mL Oral PRN Nati Haney MD        hydrOXYzine (ATARAX) tablet 50 mg  50 mg Oral TID PRN Nati Haney MD        haloperidol lactate (HALDOL) injection 5 mg  5 mg Intramuscular Q4H PRN Nati Haney MD        Or    haloperidol (HALDOL) tablet 5 mg  5 mg Oral Q4H PRN Nati Haney MD        traZODone (DESYREL) tablet 50 mg  50 mg Oral Nightly PRN Nati Haney MD        benztropine mesylate (COGENTIN) injection 2 mg  2 mg Intramuscular BID PRLETHA Haney MD           ALLERGIES: Patient has no known allergies. REVIEW OF SYSTEM:   ROS as noted in HPI, 12 point ROS reviewed and otherwise negative.     OBJECTIVE  PHYSICAL EXAM: /70   Pulse 80   Temp 98 °F (36.7 °C) (Oral)   Resp 18   LMP 12/15/2020 (Exact Date)   SpO2 98%   Breastfeeding No   CONSTITUTIONAL:  awake, alert, tearful, no apparent distress, and appears stated age  EYES:  Lids and lashes normal, pupils equal, round and reactive to light, extra ocular muscles intact, sclera clear, conjunctiva normal

## 2021-01-07 NOTE — PROGRESS NOTES
Report From [facility]. Dx is Major depression. Admitted involuntary under Dr. Joy Due y.o. female with a history of anxiety, depression presents with suicide attempt.  Patient overdosed on fluoxetine 10mg tabs 30 mins before arrival. She said that she took handful in an attempt to kill herself because she broke up with her boyfriend. Pt is tearful and evasive with questions. Flat/sad affect. She denies any symptoms currently other than being sleepy.  She has no abdominal pain, nausea, vomiting, diarrhea.  Patient ingested the pills half an hour before presenting to the ER.  She was given charcoal in the ER.

## 2021-01-07 NOTE — PROGRESS NOTES
Patient did not attend group despite staff encouragement.   Electronically signed by Stacie Ruiz on 1/7/2021 at 5:44 PM

## 2021-01-07 NOTE — CARE COORDINATION
Brief Intervention and Referral to Treatment Summary    Patient was provided PHQ-9, AUDIT and DAST Screening:      PHQ-9 Score: 20  AUDIT Score:  0  DAST Score:  0    Patients substance use is considered     Low Risk/[facility]  Moderate Risk  Harmful  Dependent    Patients depression is considered:     Minimal  Mild   Moderate  Moderately Severe  Severe x    Brief Education Was Provided    Patient was receptivex  Patient was not receptive      Brief Intervention Is Provided (Only for AUDIT or DAST)     Patient reports readiness to decrease and/or stop use and a plan was discussed   Patient denies readiness to decrease and/or stop use and a plan was not discussed      Recommendations/Referrals for Brief and/or Specialized Treatment Provided to Patient   Denies use of drugs or alcohol. Tox was negative. Patient will need to be connected.    Electronically signed by Juanita Min [facility] on 1/7/2021 at 3:58 PM

## 2021-01-07 NOTE — PROGRESS NOTES
Pt arrived to the unit via wheelchair accompanied by staff. Skin assessment and contraband search complete by this nurse and Emily Euceda. No contraband found. Pt presented to the ED with an overdoses on Fluoxetine 10mg tabs 30 mins before arrival. She states that she took handful in an attempt to kill herself because she broke up with her boyfriend. Pt is tearful and evasive with questions. Pt has a delayed response. Flat/sad affect. Isolates to her room. Pt was found hiding in the corner of her room. Pt reports she is scared to be here and wants to go home. Notified pt about the q15 checks and the security cameras. Pt had a sitter on [address] per Dr. Tay Moya not sitter required. Pt denies SI/HI and AVH. Rates both anxiety and depression 9/10. Pt reports she has felt down and depressed for a long time. Pt started taking Prozac over a year ago. Pt reports she is on disability for anxiety and depression. Being on disability pt stated her mother Nadean Degree is her legal guardian but this has not been verified. Pt broke up her boyfriend because she would like kids and he would not. Patients boyfriend told her if you get pregnant you'll have an . Pt reports this made her really sad and that's when she attempted to kill herself. Pt has disturbed sleep. Inadequate appetite. Pt has noticed a 5lbs weight loss do to not eating related to her depression. Pt lives with her mother and her mother is her only support system. Pt denies further needs. Will continue to monitor.

## 2021-01-07 NOTE — PROGRESS NOTES
Comprehensive Nutrition Assessment    Type and Reason for Visit:  Initial, Positive Nutrition Screen    Nutrition Recommendations/Plan:   Continue with General diet  Add High Calorie oral supplement @ D  Request current wt    Nutrition Assessment:  Pt admitted to [facility] unit after SA, had reports of decreased appetite and 5# weight loss < 1 month ( 5%). Only UBW in EMR is 83# in 2015, Hx of anxiety and depression. Intake charted in flowheet ' decreased' , however noted as ' no change' in Noted, General diet appropriate. Will request actual weight and begin high calorie oral supplement daily due to low BMI    Malnutrition Assessment:  Malnutrition Status: At risk for malnutrition (Comment)    Context:  Social/Environmental Circumstances     Findings of the 6 clinical characteristics of malnutrition:  Energy Intake:  Mild decrease in energy intake (Comment)  Weight Loss:  1 - 5% over 1 month     Body Fat Loss:  Unable to assess     Muscle Mass Loss:  Unable to assess    Fluid Accumulation:  No significant fluid accumulation     Strength:  Not Performed      Nutrition Related Findings:         Wounds:  None       Current Nutrition Therapies:    DIET GENERAL;     Anthropometric Measures:  · Height: 5' 3\" (160 cm)  · Current Body Weight: 91 lb (41.3 kg)(stated)   · Admission Body Weight:      · Usual Body Weight: 83 lb (37.6 kg)(2015)     · Ideal Body Weight: 115 lbs; % Ideal Body Weight 79.1 %   · BMI: 16.1  · BMI Categories: Underweight (BMI less than 18.5)       Nutrition Diagnosis:   · Inadequate oral intake related to psychological cause or life stress as evidenced by poor intake prior to admission, BMI      Nutrition Interventions:   Food and/or Nutrient Delivery:  Continue Current Diet, Start Oral Nutrition Supplement  Nutrition Education/Counseling:  Education not indicated   Coordination of Nutrition Care:  No recommendation at this time    Goals:  po > 75%, obtain actual wt

## 2021-01-07 NOTE — H&P
[address] [place] [address] Department of Psychiatry    History and Physical - Adult     CHIEF COMPLAINT:  Depression SI    History obtained from:  patient    Patient was seen after discussing with the treatment team and reviewing the chart    HISTORY OF PRESENT ILLNESS:      The patient is a 21 y.o. female live with mom, [employer] student with no significant past history of mental illness    Pt has been feeling depressed for the past 2 months  Poor historian. Rate her mood to be severely depressed  Quality:melancholic  Worse in the morning  Content: Hopeless, worthless and helpless feeling  Suicidal thoughts - has been thinking about suicide for the past few days. Once her relationship has ended- took an overdose with medication  Felt dizzy, got scared and informed her mom  Associated symptoms:  Poor concentration, anhedonia, decrease motivation  Sleep and appetite- poor    Stressors:break up with BF 2 days ago- knew him 3 years. The patient is currently receiving care for the above psychiatric illness. Medications Prior to Admission:   Medications Prior to Admission: FLUoxetine (PROZAC) 10 MG capsule, Take 10 mg by mouth daily    Compliance:no    Psychiatric Review of Systems       Depression: yes     Cintia or Hypomania:  no     Panic Attacks:  yes      Phobias:  no     Obsessions and Compulsions:  no     PTSD : no     Hallucinations:  no     Delusions:  no    Substance Abuse History:  ETOH: no   Marijuana: no  Opiates: no  Other Drugs: no      Past Psychiatric History:  Prior Diagnosis:  MARIEL  Psychiatrist: no  Therapist:no  Hospitalization: no  Hx of Suicidal Attempts: no  Hx of violence:  no  ECT: no  Previous discontinued Psychiatric Med Trials: prozac    Past Medical History:        Diagnosis Date    Anxiety     Depression        Past Surgical History:    No past surgical history on file. Allergies:   Patient has no known allergies. Family History  No family history on file.       Social History: Born and Raised: [place]  Describes Childhood:   supportive  Education: college student  Employment: Never employed  Relationships: single  Children: no children  Current Support: mother    Legal Hx: none  Access to weapons?:  No      EXAMINATION:    REVIEW OF SYSTEMS:    ROS:  [x] All negative/unchanged except if checked.  Explain positive(checked items) below:  [] Constitutional  [] Eyes  [] Ear/Nose/Mouth/Throat  [] Respiratory  [] CV  [] GI  []   [] Musculoskeletal  [] Skin/Breast  [] Neurological  [] Endocrine  [] Heme/Lymph  [] Allergic/Immunologic    Explanation:     Vitals:  /81   Pulse 93   Temp 98 °F (36.7 °C) (Oral)   Resp 16   LMP 12/15/2020 (Exact Date)   SpO2 99%   Breastfeeding No      Neurologic Exam:   Muscle Strength & Tone: full ROM  Gait: normal gait   Involuntary Movements: No    Mental Status Examination:    Level of consciousness:  within normal limits   Appearance:  ill-appearing  Behavior/Motor:  psychomotor retardation  Attitude toward examiner:  cooperative  Speech:  slow   Mood: anxious, decreased range and depressed  Affect:  blunted  Thought processes:  slow   Thought content:  Suicidal Ideation:  passive  Delusions:  no evidence of delusions  Perceptual Disturbance:  denies any perceptual disturbance  Cognition:  oriented to person, place, and time   Concentration distractible  Memory intact  Insight poor   Judgement poor   Fund of Knowledge limited    Mini Mental Status 30/30      DIAGNOSIS:     MDD single episode severe   S/P Serious overdose   MARIEL        RISK ASSESSMENT:    SUICIDE RISK ASSESSMENT: high  HOMICIDE: low  AGITATION/VIOLENCE: low  ELOPEMENT: low    LABS: REVIEWED TODAY:  Recent Labs     01/05/21  2300   WBC 9.5   HGB 12.8        Recent Labs     01/05/21  2300 01/06/21  0450   * 142   K 3.9 3.9   CL 98 108*   CO2 27 23   BUN 11 7   CREATININE 0.70 0.65   GLUCOSE 118* 100*     Recent Labs     01/05/21  2300 01/06/21  0450   BILITOT 0.3 0.3 ALKPHOS 95 69   AST 16 23   ALT 10 16     Lab Results   Component Value Date    LABAMPH Neg 01/05/2021    BARBSCNU Neg 01/05/2021    LABBENZ Neg 01/05/2021    LABMETH Neg 01/05/2021    OPIATESCREENURINE Neg 01/05/2021    PHENCYCLIDINESCREENURINE Neg 01/05/2021    ETOH <10 01/05/2021     Lab Results   Component Value Date    TSH 1.070 01/07/2021     No results found for: LITHIUM  No results found for: VALPROATE, CBMZ  No results found for: LITHIUM, VALPROATE    FURTHER LABS ORDERED :      Radiology   No results found. EKG: TRACING REVIEWED    TREATMENT PLAN:    Risk Management:  close watch and suicide risk    Collateral Information:  Will obtain collateral information from the family or friends. Will obtain medical records as appropriate from out patient providers  Will consult the hospitalist for a physical exam to rule out any co-morbid physical condition. Home medication Reconciled       New Medications started during this admission :    See orders  Prn Haldol 5mg and Vistaril 50mg q6hr for extreme agitation. Trazodone as ordered for insomnia  Vistaril as ordered for anxiety  Discussed with the patient risk, benefit, alternative and common side effects for the  proposed medication treatment. Patient is consenting to the treatment. Psychotherapy:   Encourage participation in milieu and group therapy  Individual therapy as needed        Behavioral Services  Medicare Certification      Admission Day 1  I certify that this patient's inpatient psychiatric hospital admission is medically necessary for:     (1) treatment which could reasonably be expected to improve this patient's condition, or     (2) diagnostic study or its equivalent.        Electronically signed by Kathleen Barlow MD on 1/7/2021 at 10:37 AM

## 2021-01-07 NOTE — PROGRESS NOTES
Comprehensive Nutrition Assessment    Type and Reason for Visit:  Initial, Positive Nutrition Screen    Nutrition Recommendations/Plan: Continue with general diet  Add High Calorie oral supplement @ dinner    Nutrition Assessment:  Pt admitted to I unit after SA, had reports of decreased appetite and 5# weight loss <1 month ( 5%). Only UBW in EMR is 83# in 2015; Hx of depression and anxiety. Intake charted as decreased in flowsheet, however noted as no change  in Notes. General diet appears appropriate.  Will request actual weight and begin High Calorie Oral supplement daily due to low BMI    Malnutrition Assessment:  Malnutrition Status:  Mild malnutrition    Context:  Social/Environmental Circumstances       Wounds:  None       Current Nutrition Therapies:    No diet orders on file    Anthropometric Measures:  · Height: 5' 3\" (160 cm)  · Current Body Weight: 91 lb (41.3 kg)(stated)   · Admission Body Weight:      · Usual Body Weight: 83 lb (37.6 kg)(2015)     · Ideal Body Weight: 115 lbs;\  · BMI: 16.1  · BMI Categories: Underweight (BMI less than 18.5)       Nutrition Diagnosis:   · Inadequate oral intake related to psychological cause or life stress as evidenced by poor intake prior to admission, BMI      Nutrition Interventions:   Food and/or Nutrient Delivery:  Continue Current Diet, Start Oral Nutrition Supplement  Nutrition Education/Counseling:  Education not indicated   Coordination of Nutrition Care:  No recommendation at this time    Goals:  po > 75%, obtain actual weight       Nutrition Monitoring and Evaluation:     Food/Nutrient Intake Outcomes:  Food and Nutrient Intake, Supplement Intake  Physical Signs/Symptoms Outcomes:  Meal Time Behavior, Weight     Discharge Planning:    Continue current diet     Electronically signed by Samuel Lam RD, LD on 1/7/21 at 2:52 PM EST

## 2021-01-07 NOTE — PROGRESS NOTES
Pt is slow to respond and has poor eye contact. Pt isolating to room. Pt denies shower or ADL's today. Pt encouraged to participate in proper hygiene practices. Pt presents with clean and well kept appearance. Pt reports good appetite. Pt reports good sleep. Pt rates anxiety 6/10. Pt rates depression 7/10. On a scale from 1 through 10, 10 being the highest.   Pt denies attending groups. Pt denies SI, HI and A/V hallucinations. No voiced delusions at this time. Pt alert and oriented x 4. Pt evasive and would not engage in conversation. Pt answer most questions with one or two word responses. No s/s of distress noted. Will continue to monitor.    Electronically signed by Wille Apgar, RN on 2021 at 12:49 PM

## 2021-01-07 NOTE — SUICIDE SAFETY PLAN
SAFETY PLAN    A suicide Safety Plan is a document that supports someone when they are having thoughts of suicide. Warning Signs that indicate a suicidal crisis may be developing: What (situations, thoughts, feelings, body sensations, behaviors, etc.) do you experience that lets you know you are beginning to think about suicide? 1. Sleep more  2. Have suicidal thoughts  3. isolate    Internal Coping Strategies:  What things can I do (relaxation techniques, hobbies, physical activities, etc.) to take my mind off my problems without contacting another person? 1. music  2. netflix  3. Play on phone    People and social settings that provide distraction: Who can I call or where can I go to distract me? 1. Name: mom Phone: has#  2. Name: older sister  Phone: has#   3. Place: go for coffee            4. Place: out to eat    People whom I can ask for help: Who can I call when I need help - for example, friends, family, clergy, someone else? 1. Name: mom             Phone: has  2. Name: older sister  Phone: has#      Professionals or [facility] agencies I can contact during a crisis: Who can I call for help - for example, my doctor, my psychiatrist, my psychologist, a mental health provider, a suicide hotline? 1. Clinician Name: not connected   , intake info will be on discharge      2. Suicide Prevention Lifeline: 4-719-833-TALK (0335)    3. [address] [place] [address] Emergency Services -  for example, [facility] suicide hotline, [facility] Hotline: 186      Emergency Services Address: [address] [facility]      Emergency Services Phone: [phone]    Making the environment safe: How can I make my environment (house/apartment/living space) safer? For example, can I remove guns, medications, and other items? 1. Door locked  2.  Someone there all the time

## 2021-01-07 NOTE — PROGRESS NOTES
Patient did not attend group despite staff encouragement.   Electronically signed by Christiano Lyons on 1/6/2021 at 9:51 PM

## 2021-01-07 NOTE — PROGRESS NOTES
Pt states that mother is legal guardian. Spoke with mother when she called for an update after pt gave this nurse verbal consent to update her and mother states she is her \"POA. \" Searched court records for proof of legal guardianship and no records were found.   Electronically signed by Sarmad Bautista RN on 1/7/2021 at 2:26 PM

## 2021-01-07 NOTE — PROGRESS NOTES
Pt. refused to attend the 1100 skills group, despite staff encouragement. Electronically signed by Scarlett John, [address] on 1/7/2021 at 1:33 PM

## 2021-01-07 NOTE — PROGRESS NOTES
Pt is given a pair of pants and shirt and a pair of underwear from the clothing closet as no clothes were found in her belongings.

## 2021-01-08 PROCEDURE — 1240000000 HC EMOTIONAL WELLNESS R&B

## 2021-01-08 PROCEDURE — 99232 SBSQ HOSP IP/OBS MODERATE 35: CPT | Performed by: PSYCHIATRY & NEUROLOGY

## 2021-01-08 PROCEDURE — 6370000000 HC RX 637 (ALT 250 FOR IP): Performed by: PSYCHIATRY & NEUROLOGY

## 2021-01-08 RX ADMIN — HYDROXYZINE HYDROCHLORIDE 50 MG: 25 TABLET, FILM COATED ORAL at 08:25

## 2021-01-08 RX ADMIN — SERTRALINE HYDROCHLORIDE 25 MG: 25 TABLET ORAL at 08:25

## 2021-01-08 NOTE — PROGRESS NOTES
Patient did not attend group despite staff encouragement.   Electronically signed by Zoltan Latif on 1/8/2021 at 5:26 PM

## 2021-01-08 NOTE — PROGRESS NOTES
Staff continues to encourage pt to attend groups and socialize with peers. Pt continues to decline. Pt also encouraged to shower; continues to decline. Resting in bed with eyes closed. Will continue to monitor.

## 2021-01-08 NOTE — PROGRESS NOTES
PT noted to have been out of room more today after much encouragement. Seen taking small bites only for breakfast. Noted to have been socializing with select peers only. Pt extremely soft spoken, guarded and with delayed responses. Poor eye contact noted during interview. Preoccupied with thoughts of D/C. Poor insight. States she recently had breakup after 3 years which lead her to attempt suicide. Denies any previous attempts or self harm. Rates her anxiety 10/10 and depression 9/10. Reports poor sleep but a \"normal\" appetite; despite minimal intake. Denies any SI, HI or AVH. Reports having no support system, other then her mother. States at home she listens to music to help cope with depression and anxiety. Offered sensory room; pt declined. PT resting in bed at this time as she is \"really tired today\". Will continue to monitor.

## 2021-01-08 NOTE — PROGRESS NOTES
Patient did not attend group despite staff encouragement.   Electronically signed by Breonna Zuleta on 1/7/2021 at 7:07 PM

## 2021-01-08 NOTE — GROUP NOTE
Group Therapy Note    Date: 1/8/2021    Group Start Time: 1000  Group End Time: 1100  Group Topic: Recreational    [facility]    Kelly De Anda        Group Therapy Note    Attendees: 13         Patient's Goal:  To get better    Notes:  Pt was attentive     Status After Intervention:  Unchanged    Participation Level: Minimal    Participation Quality: Appropriate      Speech:  mute      Thought Process/Content: Logical      Affective Functioning: Congruent      Mood: calm      Level of consciousness:  Alert      Response to Learning: Able to verbalize current knowledge/experience      Endings: None Reported    Modes of Intervention: Activity      Discipline Responsible: PCA      Signature:   Laurie Fletcher

## 2021-01-08 NOTE — PROGRESS NOTES
Pt encouraged strongly to go to psychotherapy group but pt refuses. Patient did go to art therapy where they played e[employer]onne[employer]. Pt was not social with peers.  Electronically signed by Ata Navarro LPN on 6/8/6671 at 87:10 AM

## 2021-01-08 NOTE — CARE COORDINATION
Family/Support Name: Alfred Colon #: [phone]  Relationship to Patient: mom    Placed call to above for collateral information,    Response: attempted to call number provided by pt and her emergency contact and it provides a message stating the number is no longer active. Writer will approach pt for different number.  Electronically signed by ROMAN Roman on 1/8/2021 at 10:41 AM

## 2021-01-08 NOTE — PROGRESS NOTES
Pt is talked with this am, and encouraged to come out for breakfast.  Pt is given her morning medication along with Vistaril 50 mg for anxiety. Patient will be encouraged to participate in activities today and eat in the dining area. Pt is out during morning breakfast, picking at her food. Pt is encouraged to get vitals this am out in the dining area. Pt reluctantly follows this nurse out to the dining area to eat breakfast.  Pt is awaiting to hear from her mom as to when she is going home. Pt states that \"I am not sure why I did what I did. \"  Patient currently attending [employer] (on a break at this point due to [employer]) studying Graphic art. Pt brightens when talking about this. Patient states she lives in a cramped house with her mother and her 15 yo sister. Patient is asked about her SSI and her dissabilities and pt states \"you have to ask my mom those questions. \"  Patient states she graduated from highschool but does not remember when she did not have anxiety. Patient states her sister is fine but at home doing online school due to Covid. Patient has an annoyed look on her fac at this time sitting in the dining area.  Electronically signed by Mohsen Tan LPN on 2/4/1100 at 7:58 AM

## 2021-01-08 NOTE — PROGRESS NOTES
Pt. attended the 0900 community meeting. Electronically signed by Aileen Concepcion on 1/8/2021 at 11:26 AM

## 2021-01-08 NOTE — PROGRESS NOTES
[facility] FOLLOW-UP NOTE       1/8/2021     Patient was seen and examined in person, Chart reviewed   Patient's case discussed with staff/team    Chief Complaint: depression, SI    Interim History:     Pt still look depressed  Pt was raised by her mom, with her sister  Dad was not really around, mom taking care of grandmother who passed away 2017  Pt graduated from [employer] [place] [address] and currently in college  Needed little help during her school but was in normal stream  Pt has significant social anxiety  Rate her mood to be 8/10  Hopeless feeling  Denies any active SI today  PMR ++  Appetite:   [] Normal/Unchanged  [] Increased  [x] Decreased      Sleep:       [] Normal/Unchanged  [x] Fair       [] Poor              Energy:    [] Normal/Unchanged  [] Increased  [x] Decreased        SI [] Present  [x] Absent    HI  []Present  [x] Absent     Aggression:  [] yes  [] no    Patient is [] able  [x] unable to CONTRACT FOR SAFETY     PAST MEDICAL/PSYCHIATRIC HISTORY:   Past Medical History:   Diagnosis Date    Anxiety     Depression        FAMILY/SOCIAL HISTORY:  No family history on file.   Social History     Socioeconomic History    Marital status: Single     Spouse name: Not on file    Number of children: Not on file    Years of education: Not on file    Highest education level: Not on file   Occupational History    Not on file   Social Needs    Financial resource strain: Not on file    Food insecurity     Worry: Not on file     Inability: Not on file    Transportation needs     Medical: Not on file     Non-medical: Not on file   Tobacco Use    Smoking status: Never Smoker    Smokeless tobacco: Never Used   Substance and Sexual Activity    Alcohol use: No    Drug use: No    Sexual activity: Not on file   Lifestyle    Physical activity     Days per week: Not on file     Minutes per session: Not on file    Stress: Not on file   Relationships    Social connections Talks on phone: Not on file     Gets together: Not on file     Attends Taoism service: Not on file     Active member of club or organization: Not on file     Attends meetings of clubs or organizations: Not on file     Relationship status: Not on file    Intimate partner violence     Fear of current or ex partner: Not on file     Emotionally abused: Not on file     Physically abused: Not on file     Forced sexual activity: Not on file   Other Topics Concern    Not on file   Social History Narrative    Not on file           ROS:  [x] All negative/unchanged except if checked.  Explain positive(checked items) below:  [] Constitutional  [] Eyes  [] Ear/Nose/Mouth/Throat  [] Respiratory  [] CV  [] GI  []   [] Musculoskeletal  [] Skin/Breast  [] Neurological  [] Endocrine  [] Heme/Lymph  [] Allergic/Immunologic    Explanation:     MEDICATIONS:    Current Facility-Administered Medications:     influenza quadrivalent split vaccine (FLUZONE;FLUARIX;FLULAVAL;AFLURIA) injection 0.5 mL, 0.5 mL, Intramuscular, Prior to discharge, Yoan Finn MD    sertraline (ZOLOFT) tablet 25 mg, 25 mg, Oral, Daily, Yoan Finn MD, 25 mg at 01/08/21 0825    acetaminophen (TYLENOL) tablet 650 mg, 650 mg, Oral, Q4H PRN, Yoan Finn MD    magnesium hydroxide (MILK OF MAGNESIA) 400 MG/5ML suspension 30 mL, 30 mL, Oral, Daily PRN, Yoan Finn MD    aluminum & magnesium hydroxide-simethicone (MAALOX) 200-200-20 MG/5ML suspension 30 mL, 30 mL, Oral, PRN, Yoan Finn MD    hydrOXYzine (ATARAX) tablet 50 mg, 50 mg, Oral, TID PRN, Yoan Finn MD, 50 mg at 01/08/21 0825    haloperidol lactate (HALDOL) injection 5 mg, 5 mg, Intramuscular, Q4H PRN **OR** haloperidol (HALDOL) tablet 5 mg, 5 mg, Oral, Q4H PRN, Yoan Finn MD    traZODone (DESYREL) tablet 50 mg, 50 mg, Oral, Nightly PRN, Yoan Finn MD   benztropine mesylate (COGENTIN) injection 2 mg, 2 mg, Intramuscular, BID PRN, Marii Barnett MD      Examination:  /65   Pulse 102   Temp 98 °F (36.7 °C) (Oral)   Resp 16   Ht 5' 3\" (1.6 m)   Wt 92 lb (41.7 kg)   LMP 12/15/2020 (Exact Date)   SpO2 96%   Breastfeeding No   BMI 16.30 kg/m²   Gait - steady  Medication side effects(SE): no    Mental Status Examination:    Level of consciousness:  within normal limits   Appearance:  poor grooming and poor hygiene  Behavior/Motor:  psychomotor retardation  Attitude toward examiner:  cooperative  Speech:  slow   Mood: decreased range, depressed and dysthymic  Affect:  blunted  Thought processes:  slow   Thought content:  Suicidal Ideation:  passive  Delusions:  no evidence of delusions  Perceptual Disturbance:  denies any perceptual disturbance  Cognition:  oriented to person, place, and time   Concentration poor  Insight fair   Judgement fair     ASSESSMENT:   Patient symptoms are:  [] Well controlled  [] Improving  [] Worsening  [x] No change      Diagnosis:   Active Problems:    Major depressive disorder without psychotic features  Resolved Problems:    * No resolved hospital problems.  *      LABS:    Recent Labs     01/05/21  2300   WBC 9.5   HGB 12.8        Recent Labs     01/05/21  2300 01/06/21  0450   * 142   K 3.9 3.9   CL 98 108*   CO2 27 23   BUN 11 7   CREATININE 0.70 0.65   GLUCOSE 118* 100*     Recent Labs     01/05/21  2300 01/06/21  0450   BILITOT 0.3 0.3   ALKPHOS 95 69   AST 16 23   ALT 10 16     Lab Results   Component Value Date    LABAMPH Neg 01/05/2021    BARBSCNU Neg 01/05/2021    LABBENZ Neg 01/05/2021    LABMETH Neg 01/05/2021    OPIATESCREENURINE Neg 01/05/2021    PHENCYCLIDINESCREENURINE Neg 01/05/2021    ETOH <10 01/05/2021     Lab Results   Component Value Date    TSH 1.070 01/07/2021     No results found for: LITHIUM  No results found for: VALPROATE, CBMZ    RISK ASSESSMENT: high suicide risk Treatment Plan:  Reviewed current Medications with the patient. Medication as ordered  Risks, benefits, side effects, drug-to-drug interactions and alternatives to treatment were discussed. Collateral information: pending  CD evaluation  Encourage patient to attend group and other milieu activities.   Discharge planning discussed with the patient and treatment team.    PSYCHOTHERAPY/COUNSELING:  [x] Therapeutic interview  [x] Supportive  [] CBT  [] Ongoing  [] Other    [x] Patient continues to need, on a daily basis, active treatment furnished directly by or requiring the supervision of inpatient psychiatric personnel      Anticipated Length of stay:            Electronically signed by Thomas Rich MD on 1/8/2021 at 11:08 AM

## 2021-01-08 NOTE — PROGRESS NOTES
Pt isolating to room. Pt tearful, slow to engage in conversation. Evasive, preoccupied with discharge, voice, \"I miss my mom. \" Pt answer most questions with one or two word responses. Pt reports poor appetite. Pt would not elaborate, why? Weight 91 lbs. Pt reports good sleep. Pt rates anxiety, 4 /10. Pt rates depression, 5/10. On a scale from 1 through 10, 10 being the highest. Pt denies SI, HI and A/V hallucinations. No voiced delusions at this time. Pt alert and oriented x2. Will continue to monitor.

## 2021-01-09 PROCEDURE — 6370000000 HC RX 637 (ALT 250 FOR IP): Performed by: PSYCHIATRY & NEUROLOGY

## 2021-01-09 PROCEDURE — 1240000000 HC EMOTIONAL WELLNESS R&B

## 2021-01-09 PROCEDURE — 99232 SBSQ HOSP IP/OBS MODERATE 35: CPT | Performed by: PSYCHIATRY & NEUROLOGY

## 2021-01-09 RX ADMIN — SERTRALINE HYDROCHLORIDE 25 MG: 25 TABLET ORAL at 08:54

## 2021-01-09 NOTE — PROGRESS NOTES
Daily Note: [id number]    Pt denies SI, HI, and A/V hallucinations. Pt reports feeling anxious and reports depression r/t her recent breakup. Pt encouraged to shower. Pt isolated to room and states that she doesn't like going to groups because \" I don't like people asking me questions\". Pt talks in a soft child like voice and presents with poor eye contact and a flat affect. Pt out for meals but not eating more than a few bites.

## 2021-01-09 NOTE — PROGRESS NOTES
Patient did not attend group despite staff encouragement.   Electronically signed by Maddie Pena on 1/9/2021 at 5:28 PM

## 2021-01-09 NOTE — PROGRESS NOTES
Pt. attended the 0900 community meeting.  Electronically signed by Jumana Sloan on 1/9/2021 at 9:51 AM

## 2021-01-09 NOTE — PROGRESS NOTES
Daily Note: 8939-1385    Pt isolative to room, presents with sad and flat affect. Pt out for meals, noted to push food around on her plate but not eating many bites of her food. Pt denies SI, HI, and A/V hallucinations. Pt reports moderate anxiety and depression. Pt denies any other issues at this time.

## 2021-01-09 NOTE — PROGRESS NOTES
Pt encouraged to come out for meals, which pt is seen doing so from Joel. Pt picks at food and does not eat. Pt does take sips of drink. Patient sits for a small period of time and then goes back to room. Patient is encouraged to attend groups, but declines. Patient takes her medications without problems and does recite her birthdate. Pt is very soft spoken and has poor eye contact.  Electronically signed by Tiffany Belcher LPN on 1/6/4055 at 4:75 PM

## 2021-01-09 NOTE — PROGRESS NOTES
Patient did not attend group despite staff encouragement.   Electronically signed by Corey Potter on 1/8/2021 at 7:29 PM

## 2021-01-09 NOTE — GROUP NOTE
Group Therapy Note    Date: 1/8/2021    Group Start Time: 2105  Group End Time: 2135  Group Topic: Wrap-Up    [facility] IV[facility]TE Serna        Group Therapy Note    Attendees: 11/15       Patient's Goal:  \"To get better. \"    Notes:  Patient reports meeting their goal for today. Patient did not participate in a guided body scan meditation.     Status After Intervention:  Unchanged    Participation Level: Minimal    Participation Quality: Appropriate and Lethargic      Speech:  hesitant      Thought Process/Content: Logical      Affective Functioning: Flat and Constricted/Restricted      Mood: anxious and depressed      Level of consciousness:  Drowsy      Response to Learning: Progressing to goal      Endings: None Reported    Modes of Intervention: Support      Discipline Responsible: Behavorial Health Tech      Signature:  Cain Serna

## 2021-01-09 NOTE — PROGRESS NOTES
[facility]. FOLLOW-UP NOTE       1/9/2021     Patient was seen and examined in person, Chart reviewed   Patient's case discussed with staff/team    Chief Complaint: depression, SI    Interim History:     Pt looked slightly better  Brighter affect today  Knew her BF for 3 years, did not know why he left her  He does not seem to be interested in her anymore  Pt still think about him  Less feeling of hopeless  Happy to be alive and remorseful about the SA    Appetite:   [] Normal/Unchanged  [] Increased  [x] Decreased      Sleep:       [] Normal/Unchanged  [x] Fair       [] Poor              Energy:    [] Normal/Unchanged  [] Increased  [x] Decreased        SI [] Present  [x] Absent    HI  []Present  [x] Absent     Aggression:  [] yes  [] no    Patient is [] able  [x] unable to CONTRACT FOR SAFETY     PAST MEDICAL/PSYCHIATRIC HISTORY:   Past Medical History:   Diagnosis Date    Anxiety     Depression        FAMILY/SOCIAL HISTORY:  No family history on file.   Social History     Socioeconomic History    Marital status: Single     Spouse name: Not on file    Number of children: Not on file    Years of education: Not on file    Highest education level: Not on file   Occupational History    Not on file   Social Needs    Financial resource strain: Not on file    Food insecurity     Worry: Not on file     Inability: Not on file    Transportation needs     Medical: Not on file     Non-medical: Not on file   Tobacco Use    Smoking status: Never Smoker    Smokeless tobacco: Never Used   Substance and Sexual Activity    Alcohol use: No    Drug use: No    Sexual activity: Not on file   Lifestyle    Physical activity     Days per week: Not on file     Minutes per session: Not on file    Stress: Not on file   Relationships    Social connections     Talks on phone: Not on file     Gets together: Not on file     Attends [employer] service: Not on file Active member of club or organization: Not on file     Attends meetings of clubs or organizations: Not on file     Relationship status: Not on file    Intimate partner violence     Fear of current or ex partner: Not on file     Emotionally abused: Not on file     Physically abused: Not on file     Forced sexual activity: Not on file   Other Topics Concern    Not on file   Social History Narrative    Not on file           ROS:  [x] All negative/unchanged except if checked.  Explain positive(checked items) below:  [] Constitutional  [] Eyes  [] Ear/Nose/Mouth/Throat  [] Respiratory  [] CV  [] GI  []   [] Musculoskeletal  [] Skin/Breast  [] Neurological  [] Endocrine  [] Heme/Lymph  [] Allergic/Immunologic    Explanation:     MEDICATIONS:    Current Facility-Administered Medications:     influenza quadrivalent split vaccine (FLUZONE;FLUARIX;FLULAVAL;AFLURIA) injection 0.5 mL, 0.5 mL, Intramuscular, Prior to discharge, Katie Luke MD    sertraline (ZOLOFT) tablet 25 mg, 25 mg, Oral, Daily, Katie Luke MD, 25 mg at 01/09/21 0854    acetaminophen (TYLENOL) tablet 650 mg, 650 mg, Oral, Q4H PRN, Katie Luke MD    magnesium hydroxide (MILK OF MAGNESIA) 400 MG/5ML suspension 30 mL, 30 mL, Oral, Daily PRN, Ktaie Luke MD    aluminum & magnesium hydroxide-simethicone (MAALOX) 200-200-20 MG/5ML suspension 30 mL, 30 mL, Oral, PRN, Katie Luke MD    hydrOXYzine (ATARAX) tablet 50 mg, 50 mg, Oral, TID PRN, Katie Luke MD, 50 mg at 01/08/21 0825    haloperidol lactate (HALDOL) injection 5 mg, 5 mg, Intramuscular, Q4H PRN **OR** haloperidol (HALDOL) tablet 5 mg, 5 mg, Oral, Q4H PRN, Katie Luke MD    traZODone (DESYREL) tablet 50 mg, 50 mg, Oral, Nightly PRN, Katie Luke MD    benztropine mesylate (COGENTIN) injection 2 mg, 2 mg, Intramuscular, BID PRN, Katie Luke MD      Examination: BP 93/62   Pulse 95   Temp 98 °F (36.7 °C) (Oral)   Resp 16   Ht 5' 3\" (1.6 m)   Wt 92 lb (41.7 kg)   LMP 12/15/2020 (Exact Date)   SpO2 97%   Breastfeeding No   BMI 16.30 kg/m²   Gait - steady  Medication side effects(SE): no    Mental Status Examination:    Level of consciousness:  within normal limits   Appearance:  poor grooming and poor hygiene  Behavior/Motor:  psychomotor retardation  Attitude toward examiner:  cooperative  Speech:  slow   Mood: decreased range, depressed and dysthymic  Affect:  blunted  Thought processes:  slow   Thought content:  Suicidal Ideation:  denies  Delusions:  no evidence of delusions  Perceptual Disturbance:  denies any perceptual disturbance  Cognition:  oriented to person, place, and time   Concentration poor  Insight fair   Judgement fair     ASSESSMENT:   Patient symptoms are:  [] Well controlled  [x] Improving  [] Worsening  [] No change      Diagnosis:   Active Problems:    Major depressive disorder without psychotic features  Resolved Problems:    * No resolved hospital problems. *      LABS:    No results for input(s): WBC, HGB, PLT in the last 72 hours. No results for input(s): NA, K, CL, CO2, BUN, CREATININE, GLUCOSE in the last 72 hours. No results for input(s): BILITOT, ALKPHOS, AST, ALT in the last 72 hours. Lab Results   Component Value Date    LABAMPH Neg 01/05/2021    BARBSCNU Neg 01/05/2021    LABBENZ Neg 01/05/2021    LABMETH Neg 01/05/2021    OPIATESCREENURINE Neg 01/05/2021    PHENCYCLIDINESCREENURINE Neg 01/05/2021    ETOH <10 01/05/2021     Lab Results   Component Value Date    TSH 1.070 01/07/2021     No results found for: LITHIUM  No results found for: VALPROATE, CBMZ    RISK ASSESSMENT: high suicide risk    Treatment Plan:  Reviewed current Medications with the patient. Medication as ordered  Risks, benefits, side effects, drug-to-drug interactions and alternatives to treatment were discussed.   Collateral information: pending  CD evaluation Encourage patient to attend group and other milieu activities.   Discharge planning discussed with the patient and treatment team.    PSYCHOTHERAPY/COUNSELING:  [x] Therapeutic interview  [x] Supportive  [] CBT  [] Ongoing  [] Other    [x] Patient continues to need, on a daily basis, active treatment furnished directly by or requiring the supervision of inpatient psychiatric personnel      Anticipated Length of stay: monday            Electronically signed by Federico Wilson MD on 1/9/2021 at 9:58 AM

## 2021-01-10 PROCEDURE — 6370000000 HC RX 637 (ALT 250 FOR IP): Performed by: PSYCHIATRY & NEUROLOGY

## 2021-01-10 PROCEDURE — 99232 SBSQ HOSP IP/OBS MODERATE 35: CPT | Performed by: PSYCHIATRY & NEUROLOGY

## 2021-01-10 PROCEDURE — 1240000000 HC EMOTIONAL WELLNESS R&B

## 2021-01-10 RX ADMIN — SERTRALINE HYDROCHLORIDE 25 MG: 25 TABLET ORAL at 08:18

## 2021-01-10 NOTE — PROGRESS NOTES
Daily Note: 5786-1897    Pt denies SI, HI, and A/V hallucinations. Pt more visible on the unit, non social with peers. Pt out for meals and attended select groups. Pt encouraged to shower, pt did shower. Pt reports fair appetite, noted to consume only a few bites of food. Pt reports broken sleep. Pt reports moderate depression and anxiety r/t missing home. Pt focused on discharge. Pt continues to present with a flat affect and speaks in a childlike voice.

## 2021-01-10 NOTE — GROUP NOTE
Group Therapy Note    Date: 1/10/2021    Group Start Time: 1000  Group End Time: 5731  Group Topic: Group Therapy    [facility]    MO Guajardo        Group Therapy Note    Attendees: 6         Patient's Goal:  To participate in group therapy. Notes:  Patient was quiet for most of the group. Therapist encouraged patient to share what she had heard or learned. Patient stated she is attempting to learn from her mistakes. Status After Intervention:  Improved    Participation Level: Active Listener    Participation Quality: Attentive      Speech:  hesitant      Thought Process/Content: Logical      Affective Functioning: Congruent      Mood: anxious      Level of consciousness:  Alert      Response to Learning: Able to verbalize current knowledge/experience      Endings: None Reported    Modes of Intervention: Education      Discipline Responsible: /Counselor      Signature:   MO Guajardo

## 2021-01-10 NOTE — PROGRESS NOTES
[facility]. FOLLOW-UP NOTE       1/10/2021     Patient was seen and examined in person, Chart reviewed   Patient's case discussed with staff/team    Chief Complaint: depression, SI    Interim History:     Pt feel better in her mood  Feel tired  Did not sleep good last night  Anxiety rumination  Denies having any active SI  Knew her BF for 3 years, did not know why he left her  He does not seem to be interested in her anymore  Pt still think about him      Appetite:   [] Normal/Unchanged  [] Increased  [x] Decreased      Sleep:       [] Normal/Unchanged  [x] Fair       [] Poor              Energy:    [] Normal/Unchanged  [] Increased  [x] Decreased        SI [] Present  [x] Absent    HI  []Present  [x] Absent     Aggression:  [] yes  [] no    Patient is [] able  [x] unable to CONTRACT FOR SAFETY     PAST MEDICAL/PSYCHIATRIC HISTORY:   Past Medical History:   Diagnosis Date    Anxiety     Depression        FAMILY/SOCIAL HISTORY:  No family history on file.   Social History     Socioeconomic History    Marital status: Single     Spouse name: Not on file    Number of children: Not on file    Years of education: Not on file    Highest education level: Not on file   Occupational History    Not on file   Social Needs    Financial resource strain: Not on file    Food insecurity     Worry: Not on file     Inability: Not on file    Transportation needs     Medical: Not on file     Non-medical: Not on file   Tobacco Use    Smoking status: Never Smoker    Smokeless tobacco: Never Used   Substance and Sexual Activity    Alcohol use: No    Drug use: No    Sexual activity: Not on file   Lifestyle    Physical activity     Days per week: Not on file     Minutes per session: Not on file    Stress: Not on file   Relationships    Social connections     Talks on phone: Not on file     Gets together: Not on file     Attends [employer] service: Not on file Active member of club or organization: Not on file     Attends meetings of clubs or organizations: Not on file     Relationship status: Not on file    Intimate partner violence     Fear of current or ex partner: Not on file     Emotionally abused: Not on file     Physically abused: Not on file     Forced sexual activity: Not on file   Other Topics Concern    Not on file   Social History Narrative    Not on file           ROS:  [x] All negative/unchanged except if checked.  Explain positive(checked items) below:  [] Constitutional  [] Eyes  [] Ear/Nose/Mouth/Throat  [] Respiratory  [] CV  [] GI  []   [] Musculoskeletal  [] Skin/Breast  [] Neurological  [] Endocrine  [] Heme/Lymph  [] Allergic/Immunologic    Explanation:     MEDICATIONS:    Current Facility-Administered Medications:     influenza quadrivalent split vaccine (FLUZONE;FLUARIX;FLULAVAL;AFLURIA) injection 0.5 mL, 0.5 mL, Intramuscular, Prior to discharge, Cait Villalpando MD    sertraline (ZOLOFT) tablet 25 mg, 25 mg, Oral, Daily, Cait Villalpando MD, 25 mg at 01/10/21 0818    acetaminophen (TYLENOL) tablet 650 mg, 650 mg, Oral, Q4H PRN, Cait Villalpando MD    magnesium hydroxide (MILK OF MAGNESIA) 400 MG/5ML suspension 30 mL, 30 mL, Oral, Daily PRN, Cait Villalpando MD    aluminum & magnesium hydroxide-simethicone (MAALOX) 200-200-20 MG/5ML suspension 30 mL, 30 mL, Oral, PRN, Cait Villalpando MD    hydrOXYzine (ATARAX) tablet 50 mg, 50 mg, Oral, TID PRN, Cait Villalpando MD, 50 mg at 01/08/21 0825    haloperidol lactate (HALDOL) injection 5 mg, 5 mg, Intramuscular, Q4H PRN **OR** haloperidol (HALDOL) tablet 5 mg, 5 mg, Oral, Q4H PRN, Cait Villalpando MD    traZODone (DESYREL) tablet 50 mg, 50 mg, Oral, Nightly PRN, Cait Villalpando MD    benztropine mesylate (COGENTIN) injection 2 mg, 2 mg, Intramuscular, BID PRN, Cait Villalpando MD      Examination: /68   Pulse 93   Temp 99 °F (37.2 °C) (Oral)   Resp 16   Ht 5' 3\" (1.6 m)   Wt 92 lb 3.2 oz (41.8 kg)   LMP 12/15/2020 (Exact Date)   SpO2 98%   Breastfeeding No   BMI 16.33 kg/m²   Gait - steady  Medication side effects(SE): no    Mental Status Examination:    Level of consciousness:  within normal limits   Appearance:  poor grooming and poor hygiene  Behavior/Motor:  psychomotor retardation  Attitude toward examiner:  cooperative  Speech:  slow   Mood: decreased range, depressed and dysthymic  Affect:  blunted  Thought processes:  slow   Thought content:  Suicidal Ideation:  denies  Delusions:  no evidence of delusions  Perceptual Disturbance:  denies any perceptual disturbance  Cognition:  oriented to person, place, and time   Concentration poor  Insight fair   Judgement fair     ASSESSMENT:   Patient symptoms are:  [] Well controlled  [x] Improving  [] Worsening  [] No change      Diagnosis:   Active Problems:    Major depressive disorder without psychotic features  Resolved Problems:    * No resolved hospital problems. *      LABS:    No results for input(s): WBC, HGB, PLT in the last 72 hours. No results for input(s): NA, K, CL, CO2, BUN, CREATININE, GLUCOSE in the last 72 hours. No results for input(s): BILITOT, ALKPHOS, AST, ALT in the last 72 hours. Lab Results   Component Value Date    LABAMPH Neg 01/05/2021    BARBSCNU Neg 01/05/2021    LABBENZ Neg 01/05/2021    LABMETH Neg 01/05/2021    OPIATESCREENURINE Neg 01/05/2021    PHENCYCLIDINESCREENURINE Neg 01/05/2021    ETOH <10 01/05/2021     Lab Results   Component Value Date    TSH 1.070 01/07/2021     No results found for: LITHIUM  No results found for: VALPROATE, CBMZ    RISK ASSESSMENT: high suicide risk    Treatment Plan:  Reviewed current Medications with the patient. Medication as ordered  Risks, benefits, side effects, drug-to-drug interactions and alternatives to treatment were discussed.   Collateral information: pending CD evaluation  Encourage patient to attend group and other milieu activities.   Discharge planning discussed with the patient and treatment team.    PSYCHOTHERAPY/COUNSELING:  [x] Therapeutic interview  [x] Supportive  [] CBT  [] Ongoing  [] Other    [x] Patient continues to need, on a daily basis, active treatment furnished directly by or requiring the supervision of inpatient psychiatric personnel      Anticipated Length of stay: monday            Electronically signed by Anabella Gunn MD on 1/10/2021 at 8:34 AM

## 2021-01-10 NOTE — PROGRESS NOTES
Patient was asked and did shower today. Patient was out of room all day, attending groups. Pt sits on the outside periphery on her peers. Pt does not engage with others. Patient was polite and cooperative with meds, wt and assessment.  Electronically signed by Les Choe LPN on 5/48/1448 at 9:90 PM

## 2021-01-10 NOTE — PROGRESS NOTES
Patient did not attend group despite staff encouragement.   Electronically signed by Bernard Tadeo on 1/10/2021 at [address] PM

## 2021-01-10 NOTE — GROUP NOTE
Group Therapy Note    Date: 1/10/2021    Group Start Time: 1100  Group End Time: 5808  Group Topic: Psychoeducation    [facility]    Chase Mckenna        Group Therapy Note    Attendees: 6         Patient's Goal:  \"To sleep better\"    Notes:  Patient kept to herself. She was slightly distracted but she work fairly well on her task in group. Status After Intervention:  Unchanged    Participation Level:  Active Listener    Participation Quality: Appropriate      Speech:  quiet      Thought Process/Content: Linear      Affective Functioning: Flat      Mood: calm      Level of consciousness:  Slightly distracted      Response to Learning: Progressing to goal      Endings: None Reported    Modes of Intervention: Education, Socialization and Activity      Discipline Responsible: Psychoeducational Specialist      Signature:  Chase Mckenna

## 2021-01-10 NOTE — PROGRESS NOTES
Patient did not attend group despite staff encouragement.   Electronically signed by Camille Gardner on 1/9/2021 at 9:28 PM

## 2021-01-10 NOTE — PROGRESS NOTES
Patient did not attend group despite staff encouragement.   Electronically signed by Shaunna Boudreaux on 1/9/2021 at 7:24 PM

## 2021-01-11 PROCEDURE — 99232 SBSQ HOSP IP/OBS MODERATE 35: CPT | Performed by: PSYCHIATRY & NEUROLOGY

## 2021-01-11 PROCEDURE — 90833 PSYTX W PT W E/M 30 MIN: CPT | Performed by: PSYCHIATRY & NEUROLOGY

## 2021-01-11 PROCEDURE — 6370000000 HC RX 637 (ALT 250 FOR IP): Performed by: PSYCHIATRY & NEUROLOGY

## 2021-01-11 PROCEDURE — 1240000000 HC EMOTIONAL WELLNESS R&B

## 2021-01-11 RX ADMIN — SERTRALINE HYDROCHLORIDE 25 MG: 25 TABLET ORAL at 09:00

## 2021-01-11 NOTE — DISCHARGE INSTR - DIET
? Good nutrition is important when healing from an illness, injury, or surgery. Follow any nutrition recommendations given to you during your hospital stay. ? If you were given an oral nutrition supplement while in the hospital, continue to take this supplement at home. You can take it with meals, in-between meals, and/or before bedtime. These supplements can be purchased at most local grocery stores, pharmacies, and chain [employer]-stores. ? If you have any questions about your diet or nutrition, call the hospital and ask for the dietitian.   As tolerated

## 2021-01-11 NOTE — PROGRESS NOTES
Pt is A & O X4., Pt states depression is 1/10, anxiety is 2/10. Pt is spending time out of room in day room sitting by self on circular bench. Denies SI/HI/AVH. When questioned about how she is sleeping pt states she' just can't get up in am.'Pt stated she attended all groups today. Also states she showered today. Appetite is improving slightly.

## 2021-01-11 NOTE — GROUP NOTE
Group Therapy Note    Date: 2021    Group Start Time: 1100  Group End Time:   Group Topic: Healthy Living/Wellness    [facility]    Donnie Zee RN        Group Therapy Note    Attendees: 10           Patient's Goal:  ***    Notes:  ***    Status After Intervention:  {Status After Intervention:679999641}    Participation Level: {Participation Level:637703458}    Participation Quality: {[facility] PARTICIPATION QUALITY:364758513}      Speech:  {ED  CD_SPEECH:76242}      Thought Process/Content: {Thought Process/Content:343857619}      Affective Functioning: {Affective Functionin}      Mood: {Mood:760264085}      Level of consciousness:  {Level of consciousness:974657371}      Response to Learnin Jania JOSE Responses to Learnin}      Endings: {[facility] Endings:15314}    Modes of Intervention: {[facility] Modes of Intervention:655685419}      Discipline Responsible: Leonardo Low IVETTE Multidisciplinary:160467477}      Signature:  Darwin De Paz RN

## 2021-01-11 NOTE — PROGRESS NOTES
Pt is offered the flu vaccine which pt refuses at this time.  Electronically signed by Noe Castellon LPN on 2/99/7408 at 9:97 AM

## 2021-01-11 NOTE — CARE COORDINATION
FAMILY COLLATERAL NOTE    Family/Support Name: josee  Contact #: [phone]  Relationship to Pt[de-identified] mom    Mom was very concrete and struggled to stay on topic. Unsure of mom's functioning level. Family/Support contact aware of hospitalization: yes   Presenting Symptoms/Current Concerns:  Pt presented to ER after mother called 911 due to pt taking unknown amount of fluoexitine hcl 10 mg tabs in order to kill herself. PT was transferred to medical floor prior to coming to . Top 3 Life Stressors:   \"boyfriend just broke up with her. \"   \"given up on classes/school. \"  \"covid\"       Background History Relevant to Current Hospitalization: \"she feels everything is useless and pointless. \" \"shes upset because her dad was not always there for her due to being in and out of long-term but now is trying to be in her life. Reports dad has a seizure disorder and is unable to drive. Reports pt is in the hospital due to boyfriend of 3 years breaking up with her. Reports pt took a large amount of pills due to this. Reports  appetite and pt went into other room and mom asked her if she was okay. Pt responds \" No, I just took a bottle of pills. \" reports pt misses her grandma who passed away over 3 years ago. Reports pt lived with grandma. Reports pt states she often felt like Komal Rangel was going to have a heart attack. \" mom thinks pt struggles with anxiety. Reports bf blocked her on all social media and results in pt's SA. Mom states she thinks pt is ready to come home. Reports pt does take care of her hygiene. Reports pt likes the play basketball and wants to take animation classes. Reports pt had IEP in school due to \"being behind in school, could hardly write/read. \" reports learning disability for math, reading. Reports pt went to online school. Pt receives SSI due to learning disabilities/anxiety/depression. Family Mental Health/Substance Use History:   Mom struggles with anxiety/depression. Support Network's Goal for Hospitalization:  \" Just want her to get better and not focus on her ex\"     Discharge Plan: discharge back home and link with outpatient services       Support Network Supportive of Discharge Plan:  In agreement       Support can confirm Safety of Location and Security of Weapons: denies       Support agreeable to Safeguard and Monitor Medications (including Prescription and OTC): in agreement with safety planning.      Identified Barriers to Compliance with Discharge Plan: n/a    Recommendations for Support Network: be available for follow up calls      Electronically signed by Moni Leroy, [facility] on 1/11/2021 at 10:34 AM

## 2021-01-11 NOTE — PROGRESS NOTES
Pt. attended the 0900 community meeting.  Electronically signed by Sarah Martinez on 1/11/2021 at 12:09 PM

## 2021-01-11 NOTE — PROGRESS NOTES
Patient did not attend group despite staff encouragement.   Electronically signed by Christiano Lyons on 1/11/2021 at 5:19 PM

## 2021-01-11 NOTE — PROGRESS NOTES
Jerry Chan ja 89[address] FOLLOW-UP NOTE       1/11/2021     Patient was seen and examined in person, Chart reviewed   Patient's case discussed with staff/team    Chief Complaint: depression, SI    Interim History:     Pt still isolating to herself  Less depressed  Anxious in social setting  Rumination about her stressor - including relationship issues  Talking to her mom   Feel tired this morning    Appetite:   [] Normal/Unchanged  [] Increased  [x] Decreased      Sleep:       [] Normal/Unchanged  [x] Fair       [] Poor              Energy:    [] Normal/Unchanged  [] Increased  [x] Decreased        SI [] Present  [x] Absent    HI  []Present  [x] Absent     Aggression:  [] yes  [] no    Patient is [] able  [x] unable to CONTRACT FOR SAFETY     PAST MEDICAL/PSYCHIATRIC HISTORY:   Past Medical History:   Diagnosis Date    Anxiety     Depression        FAMILY/SOCIAL HISTORY:  No family history on file.   Social History     Socioeconomic History    Marital status: Single     Spouse name: Not on file    Number of children: Not on file    Years of education: Not on file    Highest education level: Not on file   Occupational History    Not on file   Social Needs    Financial resource strain: Not on file    Food insecurity     Worry: Not on file     Inability: Not on file    Transportation needs     Medical: Not on file     Non-medical: Not on file   Tobacco Use    Smoking status: Never Smoker    Smokeless tobacco: Never Used   Substance and Sexual Activity    Alcohol use: No    Drug use: No    Sexual activity: Not on file   Lifestyle    Physical activity     Days per week: Not on file     Minutes per session: Not on file    Stress: Not on file   Relationships    Social connections     Talks on phone: Not on file     Gets together: Not on file     Attends Buddhism service: Not on file     Active member of club or organization: Not on file Attends meetings of clubs or organizations: Not on file     Relationship status: Not on file    Intimate partner violence     Fear of current or ex partner: Not on file     Emotionally abused: Not on file     Physically abused: Not on file     Forced sexual activity: Not on file   Other Topics Concern    Not on file   Social History Narrative    Not on file           ROS:  [x] All negative/unchanged except if checked.  Explain positive(checked items) below:  [] Constitutional  [] Eyes  [] Ear/Nose/Mouth/Throat  [] Respiratory  [] CV  [] GI  []   [] Musculoskeletal  [] Skin/Breast  [] Neurological  [] Endocrine  [] Heme/Lymph  [] Allergic/Immunologic    Explanation:     MEDICATIONS:    Current Facility-Administered Medications:     influenza quadrivalent split vaccine (FLUZONE;FLUARIX;FLULAVAL;AFLURIA) injection 0.5 mL, 0.5 mL, Intramuscular, Prior to discharge, Kurt Richey MD    sertraline (ZOLOFT) tablet 25 mg, 25 mg, Oral, Daily, Kurt Richey MD, 25 mg at 01/11/21 0900    acetaminophen (TYLENOL) tablet 650 mg, 650 mg, Oral, Q4H PRN, Kurt Richey MD    magnesium hydroxide (MILK OF MAGNESIA) 400 MG/5ML suspension 30 mL, 30 mL, Oral, Daily PRN, Kurt Richey MD    aluminum & magnesium hydroxide-simethicone (MAALOX) 200-200-20 MG/5ML suspension 30 mL, 30 mL, Oral, PRN, Kurt Richey MD    hydrOXYzine (ATARAX) tablet 50 mg, 50 mg, Oral, TID PRN, Kurt Richey MD, 50 mg at 01/08/21 0825    haloperidol lactate (HALDOL) injection 5 mg, 5 mg, Intramuscular, Q4H PRN **OR** haloperidol (HALDOL) tablet 5 mg, 5 mg, Oral, Q4H PRN, Kurt Richey MD    traZODone (DESYREL) tablet 50 mg, 50 mg, Oral, Nightly PRN, Kurt Richey MD    benztropine mesylate (COGENTIN) injection 2 mg, 2 mg, Intramuscular, BID PRN, Kurt Richey MD      Examination: BP 97/64   Pulse 103   Temp 98 °F (36.7 °C) (Oral)   Resp 18   Ht 5' 3\" (1.6 m)   Wt 92 lb 3.2 oz (41.8 kg)   LMP 12/15/2020 (Exact Date)   SpO2 92%   Breastfeeding No   BMI 16.33 kg/m²   Gait - steady  Medication side effects(SE): no    Mental Status Examination:    Level of consciousness:  within normal limits   Appearance:  better  Behavior/Motor:  Less psychomotor retardation  Attitude toward examiner:  cooperative  Speech:  slow   Mood:less depressed  Affect:  blunted  Thought processes:  slow   Thought content:  Suicidal Ideation:  denies  Delusions:  no evidence of delusions  Perceptual Disturbance:  denies any perceptual disturbance  Cognition:  oriented to person, place, and time   Concentration poor  Insight fair   Judgement fair     ASSESSMENT:   Patient symptoms are:  [] Well controlled  [x] Improving  [] Worsening  [] No change      Diagnosis:   Active Problems:    Major depressive disorder without psychotic features  Resolved Problems:    * No resolved hospital problems. *      LABS:    No results for input(s): WBC, HGB, PLT in the last 72 hours. No results for input(s): NA, K, CL, CO2, BUN, CREATININE, GLUCOSE in the last 72 hours. No results for input(s): BILITOT, ALKPHOS, AST, ALT in the last 72 hours. Lab Results   Component Value Date    LABAMPH Neg 01/05/2021    BARBSCNU Neg 01/05/2021    LABBENZ Neg 01/05/2021    LABMETH Neg 01/05/2021    OPIATESCREENURINE Neg 01/05/2021    PHENCYCLIDINESCREENURINE Neg 01/05/2021    ETOH <10 01/05/2021     Lab Results   Component Value Date    TSH 1.070 01/07/2021     No results found for: LITHIUM  No results found for: VALPROATE, CBMZ      Treatment Plan:  Reviewed current Medications with the patient. Medication as ordered  Increase zoloft to 50 mg today  Risks, benefits, side effects, drug-to-drug interactions and alternatives to treatment were discussed.   Collateral information: pending  CD evaluation Encourage patient to attend group and other milieu activities.   Discharge planning discussed with the patient and treatment team.    PSYCHOTHERAPY/COUNSELING:  [x] Therapeutic interview  [x] Supportive  [] CBT  [] Ongoing  [] Other  Patient was seen 1:1 for 20 minutes, other than E&M time spent, focusing on      - coping skills techniques     - Anxiety management techniques discussed including deep breathing exercise and PMR     - discussing patients strength and weakness      - Focusing on negative cognition and maladaptive thoughts, which is feeding and maintaining the depression symptoms       [x] Patient continues to need, on a daily basis, active treatment furnished directly by or requiring the supervision of inpatient psychiatric personnel      Anticipated Length of stay: tomorrow discharge          Electronically signed by Bowen Holman MD on 1/11/2021 at 10:38 AM

## 2021-01-11 NOTE — GROUP NOTE
Group Therapy Note    Date: 1/10/2021    Group Start Time: 6327  Group End Time: 1900  Group Topic: Healthy Living/Wellness    [facility]    Amina Obrien        Group Therapy Note    Attendees: 12/21         Patient's Goal:  To learn about socialization and why it is important. Notes:  Patient participated minimally in group discussion with much encouragement.     Status After Intervention:  Unchanged    Participation Level: Minimal    Participation Quality: Appropriate and Attentive      Speech:  hesitant      Thought Process/Content: Logical      Affective Functioning: Congruent      Mood: depressed      Level of consciousness:  Alert and Attentive      Response to Learning: Able to verbalize current knowledge/experience      Endings: None Reported    Modes of Intervention: Education      Discipline Responsible: [address], [employer]      Signature:  Amina Obrien

## 2021-01-11 NOTE — GROUP NOTE
Group Therapy Note    Date: 1/11/2021    Group Start Time: 1100  Group End Time: 1200  Group Topic: Psychoeducation    [facility]    Paul Rich, CTRS        Group Therapy Note    Attendees: 7         Patient's Goal:  \"to be more independent\"    Notes:  Pt. attended the 1100 skill group. Pt. was quiet and to herself but was pleasant upon approach. Worked on project with interest.    Status After Intervention:  Improved    Participation Level:  Active Listener and Interactive    Participation Quality: Appropriate, Attentive and Sharing      Speech:  Soft spoken       Thought Process/Content: Logical      Affective Functioning: Flat      Mood: depressed      Level of consciousness:  Alert, Oriented x4 and Attentive      Response to Learning: Progressing to goal      Endings: None Reported    Modes of Intervention: Education, Support, Socialization and Activity      Discipline Responsible: Psychoeducational Specialist      Signature:  Catracho Clarke

## 2021-01-11 NOTE — GROUP NOTE
Group Therapy Note    Date: 1/11/2021    Group Start Time: 5660  Group End Time: 1900  Group Topic: Recreational    [facility]    Bernard Tadeo        Group Therapy Note    Attendees: 9/19         Patient's Goal:  To play [employer] Wii. Notes:  Patient quietly watched peers play. Status After Intervention:  Unchanged    Participation Level:  Active Listener    Participation Quality: Attentive and Supportive      Speech:  hesitant      Thought Process/Content: Logical      Affective Functioning: Flat      Mood: euthymic      Level of consciousness:  Alert and Attentive      Response to Learning: Progressing to goal      Endings: None Reported    Modes of Intervention: Activity      Discipline Responsible: [address], [employer]      Signature:  Bernard Tadeo

## 2021-01-11 NOTE — PROGRESS NOTES
Pt was laying in her bed and stated \"I slept well last night I am just tired\". Pt attended one group today and was encouraged to attend evening group and states \"I am here because of relationship problems\". Pt has been more visible out on the unit today and had good eye contact. pt has been calm and pleasant and reports a breakup with a boyfriend of three years.  Pt denies si,hi,avh.

## 2021-01-11 NOTE — CARE COORDINATION
Group Therapy Note    Date: 1/11/2021  Start Time: 1430  End Time:  1520    Number of Participants: 8    Type of Group: Cognitive Skills    Patient's Goal:  To participate in mood management group. Notes: Patient declined to attend psychoeducation group at 1430 despite encouragement by staff.      Discipline Responsible: /Counselor    MO Abad

## 2021-01-11 NOTE — PROGRESS NOTES
PT visible on unit more this shift. Bright affect and better eye contact noted. PT remains soft spoken but has improved. Denies any depression or anxiety. Reports feeling \"tired\" today although she reports good sleep and appetite. Denies any SI, HI or AVH. Reports feeling sad she is not D/C today. Reports her mother is a good support system for her. States she will listen to music or draw at home to cope with emotions. Remains pleasant and cooperative with staff and peers. Will continue to monitor.

## 2021-01-12 VITALS
OXYGEN SATURATION: 97 % | TEMPERATURE: 98 F | DIASTOLIC BLOOD PRESSURE: 69 MMHG | RESPIRATION RATE: 18 BRPM | HEIGHT: 63 IN | HEART RATE: 102 BPM | SYSTOLIC BLOOD PRESSURE: 96 MMHG | WEIGHT: 92.2 LBS | BODY MASS INDEX: 16.34 KG/M2

## 2021-01-12 PROCEDURE — 6370000000 HC RX 637 (ALT 250 FOR IP): Performed by: PSYCHIATRY & NEUROLOGY

## 2021-01-12 PROCEDURE — 99239 HOSP IP/OBS DSCHRG MGMT >30: CPT | Performed by: PSYCHIATRY & NEUROLOGY

## 2021-01-12 RX ADMIN — SERTRALINE HYDROCHLORIDE 50 MG: 50 TABLET ORAL at 09:07

## 2021-01-12 NOTE — PROGRESS NOTES
Pt. declined to attend the 0900 community meeting, despite staff encouragement. Electronically signed by Carina Paredes, [address] on 1/12/2021 at 9:47 AM

## 2021-01-12 NOTE — PROGRESS NOTES
Pt left unit with staff, escorted to lobby and collected by family for ride home. Belonging given to pt. Denies any current SI, HI or AVH. Mood and affect stable.

## 2021-01-12 NOTE — GROUP NOTE
Group Therapy Note    Date: 1/12/2021    Group Start Time: 1005  Group End Time: 3384  Group Topic: Psychotherapy    [facility]    Js Lancaster        Group Therapy Note    Attendees: 6         Patient's Goal:  To go home    Notes:  Patient stated that she does feel better    Status After Intervention:  Improved    Participation Level: Interactive    Participation Quality: Appropriate      Speech:  normal      Thought Process/Content: Logical      Affective Functioning: Congruent      Mood: anxious      Level of consciousness:  Alert      Response to Learning: Progressing to goal      Endings: None Reported    Modes of Intervention: Support      Discipline Responsible: /Counselor      Signature:  Js Cagle

## 2021-01-12 NOTE — DISCHARGE SUMMARY
Diagnosis Date    Anxiety     Depression        FAMILY/SOCIAL HISTORY:  No family history on file.   Social History     Socioeconomic History    Marital status: Single     Spouse name: Not on file    Number of children: Not on file    Years of education: Not on file    Highest education level: Not on file   Occupational History    Not on file   Social Needs    Financial resource strain: Not on file    Food insecurity     Worry: Not on file     Inability: Not on file    Transportation needs     Medical: Not on file     Non-medical: Not on file   Tobacco Use    Smoking status: Never Smoker    Smokeless tobacco: Never Used   Substance and Sexual Activity    Alcohol use: No    Drug use: No    Sexual activity: Not on file   Lifestyle    Physical activity     Days per week: Not on file     Minutes per session: Not on file    Stress: Not on file   Relationships    Social connections     Talks on phone: Not on file     Gets together: Not on file     Attends Christianity service: Not on file     Active member of club or organization: Not on file     Attends meetings of clubs or organizations: Not on file     Relationship status: Not on file    Intimate partner violence     Fear of current or ex partner: Not on file     Emotionally abused: Not on file     Physically abused: Not on file     Forced sexual activity: Not on file   Other Topics Concern    Not on file   Social History Narrative    Not on file       MEDICATIONS:    Current Facility-Administered Medications:     sertraline (ZOLOFT) tablet 50 mg, 50 mg, Oral, Daily, Dakota Flanagan MD, 50 mg at 01/12/21 0907    acetaminophen (TYLENOL) tablet 650 mg, 650 mg, Oral, Q4H PRN, Dakota Flanagan MD    magnesium hydroxide (MILK OF MAGNESIA) 400 MG/5ML suspension 30 mL, 30 mL, Oral, Daily PRN, Dakota Flanagan MD    aluminum & magnesium hydroxide-simethicone (MAALOX) 200-200-20 MG/5ML suspension 30 mL, 30 mL, Oral, PRN, Dakota Flanagan MD   hydrOXYzine (ATARAX) tablet 50 mg, 50 mg, Oral, TID PRN, Geovany Anderson MD, 50 mg at 01/08/21 0825    haloperidol lactate (HALDOL) injection 5 mg, 5 mg, Intramuscular, Q4H PRN **OR** haloperidol (HALDOL) tablet 5 mg, 5 mg, Oral, Q4H PRN, Geovany Anderson MD    traZODone (DESYREL) tablet 50 mg, 50 mg, Oral, Nightly PRN, Geovany Anderson MD    benztropine mesylate (COGENTIN) injection 2 mg, 2 mg, Intramuscular, BID PRN, Geovany Anderson MD    Examination:  BP 96/69   Pulse 102   Temp 98 °F (36.7 °C) (Oral)   Resp 18   Ht 5' 3\" (1.6 m)   Wt 92 lb 3.2 oz (41.8 kg)   LMP 12/15/2020 (Exact Date)   SpO2 97%   Breastfeeding No   BMI 16.33 kg/m²   Gait - steady    HOSPITAL COURSE[de-identified]  Following admission to the hospital, patient had a complete physical exam and blood work up  Patient was monitored closely with suicide precaution  Patient was started on medication as listed below  Was encouraged to participate in group and other milieu activity  Patient started to feel better with this combination of treatment. Significant progress in the symptoms since admission. Mood better, with the score of 2/10 - bad  No AVH or paranoid thoughts  No Hopeless or worthless feeling  No active SI/HI  Appetite:  [x] Normal  [] Increased  [] Decreased    Sleep:       [x] Normal  [] Fair       [] Poor            Energy:    [x] Normal  [] Increased  [] Decreased     SI [] Present  [x] Absent  HI  []Present  [x] Absent   Aggression:  [] yes  [] no  Patient is [x] able  [] unable to CONTRACT FOR SAFETY   Medication side effects(SE):  [x] None(Psych.  Meds.) [] Other      Mental Status Examination on discharge:    Level of consciousness:  within normal limits   Appearance:  well-appearing  Behavior/Motor:  no abnormalities noted  Attitude toward examiner:  attentive and good eye contact  Speech:  spontaneous, normal rate and normal volume   Mood: euthymic  Affect:  mood congruent  Thought processes:  linear STOP taking these medications    FLUoxetine 10 MG capsule  Commonly known as: PROZAC           Where to Get Your Medications      These medications were sent to [facility], [address] [place] [address] [address], [address] [id number]    Phone: [phone]   · sertraline 50 MG tablet           Reason for more than one antipsychotic:   [x] N/A  [] 3 failed monotherapy(drugs tried):  [] Cross over to a new antipsychotic  [] Taper to monotherapy from polypharmacy  [] Augmentation of Clozapine therapy due to treatment resistance to single therapy        TIME SPEND - 35 MINUTES TO COMPLETE THE EVALUATION, DISCHARGE SUMMARY, MEDICATION RECONCILIATION AND FOLLOW UP CARE     Alexander Umana  1/12/2021  9:40 AM

## 2021-01-12 NOTE — GROUP NOTE
Group Therapy Note    Date: 1/11/2021    Group Start Time: 2050  Group End Time: 2110  Group Topic: Wrap-Up    [facility] NIKA Hook        Group Therapy Note    Attendees: 12/20         Patient's Goal:  \"be more independent\"    Notes:  Patient reported meeting their goal for the day. Patient did not speak, just nodding throughout the interaction.     Status After Intervention:  Unchanged    Participation Level: Minimal    Participation Quality: Appropriate and Attentive      Speech:  hesitant      Thought Process/Content: Linear      Affective Functioning: Flat      Mood: euthymic      Level of consciousness:  Alert and Attentive      Response to Learning: Progressing to goal      Endings: None Reported    Modes of Intervention: Support      Discipline Responsible: [employer]      Signature:  Slim Hook

## 2021-01-12 NOTE — PROGRESS NOTES
Discharge instructions reviewed verbally and in writing including f/u appointments. Patient verbalizes understanding and signed as such. All belongings returned for discharge. Patient denies SI, HI, A/V hallucinations, mood is stable.

## 2021-01-12 NOTE — GROUP NOTE
Group Therapy Note    Date: 1/12/2021    Group Start Time: 1100  Group End Time: 1150  Group Topic: Psychoeducation    [facility]    Nelly Scott        Group Therapy Note    Attendees: 8         Patient's Goal:  \"To get better\"    Notes:  Patient was quiet and kept to herself but she was more attentive in group and work well on her project.     Status After Intervention:  Improved    Participation Level: fair    Participation Quality: Appropriate      Speech:  quiet      Thought Process/Content: Linear      Affective Functioning: Congruent      Mood: calm      Level of consciousness:  Alert      Response to Learning: Progressing to goal      Endings: None Reported    Modes of Intervention: Education, Socialization and Activity      Discipline Responsible: Psychoeducational Specialist      Signature:  Scarlett John

## 2021-09-01 ENCOUNTER — VIRTUAL VISIT (OUTPATIENT)
Dept: FAMILY MEDICINE CLINIC | Age: 21
End: 2021-09-01
Payer: COMMERCIAL

## 2021-09-01 DIAGNOSIS — Z20.822 EXPOSURE TO COVID-19 VIRUS: Primary | ICD-10-CM

## 2021-09-01 PROCEDURE — 99442 PR PHYS/QHP TELEPHONE EVALUATION 11-20 MIN: CPT

## 2021-09-01 NOTE — PATIENT INSTRUCTIONS
Patient Education        Viral Infections: Care Instructions  Your Care Instructions     You don't feel well, but it's not clear what's causing it. You may have a viral infection. Viruses cause many illnesses, such as the common cold, influenza, fever, rashes, and the diarrhea, nausea, and vomiting that are often called \"stomach flu. \" You may wonder if antibiotic medicines could make you feel better. But antibiotics only treat infections caused by bacteria. They don't work on viruses. The good news is that viral infections usually aren't serious. Most will go away in a few days without medical treatment. In the meantime, there are a few things you can do to make yourself more comfortable. Follow-up care is a key part of your treatment and safety. Be sure to make and go to all appointments, and call your doctor if you are having problems. It's also a good idea to know your test results and keep a list of the medicines you take. How can you care for yourself at home? · Get plenty of rest if you feel tired. · Take an over-the-counter pain medicine if needed, such as acetaminophen (Tylenol), ibuprofen (Advil, Motrin), or naproxen (Aleve). Read and follow all instructions on the label. · Be careful when taking over-the-counter cold or flu medicines and Tylenol at the same time. Many of these medicines have acetaminophen, which is Tylenol. Read the labels to make sure that you are not taking more than the recommended dose. Too much acetaminophen (Tylenol) can be harmful. · Drink plenty of fluids. If you have kidney, heart, or liver disease and have to limit fluids, talk with your doctor before you increase the amount of fluids you drink. · Stay home from work, school, and other public places while you have a fever. When should you call for help? Call 911 anytime you think you may need emergency care. For example, call if:    · You have severe trouble breathing.     · You passed out (lost consciousness).    Call your doctor now or seek immediate medical care if:    · You seem to be getting much sicker.     · You have a new or higher fever.     · You have blood in your stools.     · You have new belly pain, or your pain gets worse.     · You have a new rash. Watch closely for changes in your health, and be sure to contact your doctor if:    · You start to get better and then get worse.     · You do not get better as expected. Where can you learn more? Go to https://[employer].RUNform. org and sign in to your [employer] account. Enter H272 in the [facility] box to learn more about \"Viral Infections: Care Instructions. \"     If you do not have an account, please click on the \"Sign Up Now\" link. Current as of: September 23, 2020               Content Version: 12.9  © 9801-3105 [employer]. Care instructions adapted under license by [facility] ([facility]). If you have questions about a medical condition or this instruction, always ask your healthcare professional. [address] any warranty or liability for your use of this information. Expect a 7 to 10-day course of the illness before symptoms to resolve. Return for shortness of breath when walking chest pain or fevers that cannot be controlled with Tylenol or ibuprofen. I recommend you use Sudafed (Pseudoephedrine) for sinus congestion as needed and Mucinex (Guaifenesin) for chest congestion.

## 2021-09-02 NOTE — PROGRESS NOTES
2021    TELEHEALTH EVALUATION -- Audio/Visual (During CKJQO-22 public health emergency)    Due to COVID 19 outbreak, patient's office visit was converted to a virtual visit. Patient was contacted and agreed to proceed with a virtual visit via Telephone Visit  The risks and benefits of converting to a virtual visit were discussed in light of the current infectious disease epidemic. Patient also understood that insurance coverage and co-pays are up to their individual insurance plans. HPI:    Maribell Garcia (:  2000) has requested an audio/video evaluation for the following concern(s):  Chief Complaint   Patient presents with    Covid Testing       80-year-old female requesting Covid testing due to exposure by a family member. Patient is asymptomatic    Patient Active Problem List   Diagnosis    Suicide attempt ([facility] [address]    Fluoxetine hydrochloride poisoning    Major depressive disorder without psychotic features     Past Medical History:   Diagnosis Date    Anxiety     Depression          Review of Systems    Prior to Visit Medications    Medication Sig Taking? Authorizing Provider   sertraline (ZOLOFT) 50 MG tablet Take 1 tablet by mouth daily  Mary Anne Rod MD       Social History     Tobacco Use    Smoking status: Never Smoker    Smokeless tobacco: Never Used   Vaping Use    Vaping Use: Never used   Substance Use Topics    Alcohol use: No    Drug use: No            PHYSICAL EXAMINATION:  No flowsheet data found. No exam  Phone visit  Patient in no significant distress, conversant    Due to this being a TeleHealth encounter, evaluation of the following organ systems is limited: Vitals/Constitutional/EENT/Resp/CV/GI//MS/Neuro/Skin/Heme-Lymph-Imm.     Lab Results   Component Value Date    WBC 9.5 2021    HGB 12.8 2021    HCT 40.1 2021     2021    ALT 16 2021    AST 23 2021     2021    K 3.9 2021     (H) 2021 CREATININE 0.65 01/06/2021    BUN 7 01/06/2021    CO2 23 01/06/2021    TSH 1.070 01/07/2021    INR 1.1 01/05/2021         ASSESSMENT/PLAN:     Diagnosis Orders   1. Exposure to COVID-19 virus  COVID-23    68-year-old female requested Covid testing due to exposure by positive family member. Patient is asymptomatic. Covid order placed, patient provided education on viral illness      Return if symptoms worsen or fail to improve, for Follow up with PCP. An  electronic signature was used to authenticate this note. --AUDI Aceves CNP on 9/2/2021 at 5:24 AM        Pursuant to the emergency declaration under the [facility], [facility] waiver authority and the [employer] Act, this Virtual  Visit was conducted, with patient's consent, to reduce the patient's risk of exposure to COVID-19 and provide continuity of care for an established patient.     15 minute call

## 2021-11-08 ENCOUNTER — OFFICE VISIT (OUTPATIENT)
Dept: FAMILY MEDICINE CLINIC | Age: 21
End: 2021-11-08
Payer: COMMERCIAL

## 2021-11-08 VITALS
HEART RATE: 80 BPM | TEMPERATURE: 98.7 F | OXYGEN SATURATION: 99 % | DIASTOLIC BLOOD PRESSURE: 78 MMHG | SYSTOLIC BLOOD PRESSURE: 116 MMHG

## 2021-11-08 DIAGNOSIS — Z20.822 CLOSE EXPOSURE TO 2019-NCOV: ICD-10-CM

## 2021-11-08 DIAGNOSIS — Z20.822 CLOSE EXPOSURE TO 2019-NCOV: Primary | ICD-10-CM

## 2021-11-08 PROCEDURE — G8419 CALC BMI OUT NRM PARAM NOF/U: HCPCS | Performed by: PHYSICIAN ASSISTANT

## 2021-11-08 PROCEDURE — 99212 OFFICE O/P EST SF 10 MIN: CPT | Performed by: PHYSICIAN ASSISTANT

## 2021-11-08 PROCEDURE — G8484 FLU IMMUNIZE NO ADMIN: HCPCS | Performed by: PHYSICIAN ASSISTANT

## 2021-11-08 PROCEDURE — 1036F TOBACCO NON-USER: CPT | Performed by: PHYSICIAN ASSISTANT

## 2021-11-08 PROCEDURE — G8427 DOCREV CUR MEDS BY ELIG CLIN: HCPCS | Performed by: PHYSICIAN ASSISTANT

## 2021-11-08 SDOH — ECONOMIC STABILITY: TRANSPORTATION INSECURITY
IN THE PAST 12 MONTHS, HAS THE LACK OF TRANSPORTATION KEPT YOU FROM MEDICAL APPOINTMENTS OR FROM GETTING MEDICATIONS?: NO

## 2021-11-08 SDOH — ECONOMIC STABILITY: TRANSPORTATION INSECURITY
IN THE PAST 12 MONTHS, HAS LACK OF TRANSPORTATION KEPT YOU FROM MEETINGS, WORK, OR FROM GETTING THINGS NEEDED FOR DAILY LIVING?: NO

## 2021-11-08 SDOH — ECONOMIC STABILITY: FOOD INSECURITY: WITHIN THE PAST 12 MONTHS, THE FOOD YOU BOUGHT JUST DIDN'T LAST AND YOU DIDN'T HAVE MONEY TO GET MORE.: NEVER TRUE

## 2021-11-08 SDOH — ECONOMIC STABILITY: FOOD INSECURITY: WITHIN THE PAST 12 MONTHS, YOU WORRIED THAT YOUR FOOD WOULD RUN OUT BEFORE YOU GOT MONEY TO BUY MORE.: NEVER TRUE

## 2021-11-08 ASSESSMENT — SOCIAL DETERMINANTS OF HEALTH (SDOH): HOW HARD IS IT FOR YOU TO PAY FOR THE VERY BASICS LIKE FOOD, HOUSING, MEDICAL CARE, AND HEATING?: NOT VERY HARD

## 2021-11-08 NOTE — PROGRESS NOTES
[address] Encounter  CHIEF COMPLAINT       Chief Complaint   Patient presents with    Concern For COVID-19     no symptoms       HISTORY OF PRESENT ILLNESS   Nancy Phan is a 24 y.o. female who presents with:  HPI   The patient is presenting today with CC of COVID-19 because sister is having a cough for over x1 week. Patient is not having any symptoms. No fevers, chills, nausea, or vomiting. REVIEW OF SYSTEMS     Review of Systems   Constitutional: Negative for activity change, appetite change, chills and fever. HENT: Negative for congestion, drooling, sinus pressure, sinus pain and sore throat. Eyes: Negative for visual disturbance. Respiratory: Negative for cough, chest tightness and shortness of breath. Cardiovascular: Negative for chest pain. Gastrointestinal: Negative for abdominal pain, diarrhea, nausea and vomiting. Endocrine: Negative for cold intolerance. Genitourinary: Negative for dysuria, flank pain, frequency and hematuria. Musculoskeletal: Negative for arthralgias and back pain. Skin: Negative for rash. Allergic/Immunologic: Negative for food allergies. Neurological: Negative for weakness, light-headedness, numbness and headaches. Hematological: Does not bruise/bleed easily. PAST MEDICAL HISTORY         Diagnosis Date    Anxiety     Depression      SURGICAL HISTORY     Patient  has no past surgical history on file. CURRENT MEDICATIONS       Previous Medications    SERTRALINE (ZOLOFT) 50 MG TABLET    Take 1 tablet by mouth daily     ALLERGIES     Patient is has No Known Allergies. FAMILY HISTORY     Patient'sfamily history is not on file. SOCIAL HISTORY     Patient  reports that she has never smoked. She has never used smokeless tobacco. She reports that she does not drink alcohol and does not use drugs.   PHYSICAL EXAM     VITALS  BP: 116/78, Temp: 98.7 °F (37.1 °C), Pulse: 80,  , SpO2: 99 %  Physical Exam  Vitals and nursing note reviewed. Constitutional:       General: She is awake. She is not in acute distress. Appearance: Normal appearance. She is well-developed. She is not ill-appearing, toxic-appearing or diaphoretic. HENT:      Head: Normocephalic and atraumatic. Right Ear: Hearing and external ear normal.      Left Ear: Hearing and external ear normal.      Nose: Nose normal.   Eyes:      General: Lids are normal. Vision grossly intact. Gaze aligned appropriately. Conjunctiva/sclera: Conjunctivae normal.   Cardiovascular:      Rate and Rhythm: Normal rate and regular rhythm. Pulses: Normal pulses. Heart sounds: Normal heart sounds, S1 normal and S2 normal.   Pulmonary:      Effort: Pulmonary effort is normal.      Breath sounds: Normal breath sounds and air entry. Musculoskeletal:      Cervical back: Normal range of motion. Skin:     General: Skin is warm. Capillary Refill: Capillary refill takes less than 2 seconds. Neurological:      Mental Status: She is alert and oriented to person, place, and time. Gait: Gait is intact. Psychiatric:         Attention and Perception: Attention normal.         Mood and Affect: Mood normal.         Speech: Speech normal.         Behavior: Behavior normal. Behavior is cooperative. READY CARE COURSE   Labs:  No results found for this visit on 11/08/21. IMAGING:  No orders to display     Scheduled Meds:  Continuous Infusions:  PRN Meds:. PROCEDURES:  FINAL IMPRESSION      1. Close exposure to 2019-nCoV      DISPOSITION/PLAN   1. The patient was advised to remain isolated after their COVID-19 test pending their results. The patient was informed that the results will take 2-3 days and that someone will contact the patient of their results.       We'll call with COVID-19 results  Results will also be available on your [facility] account, if activated  Things to Know:   - Do not leave home except to get medical care while waiting for your results  - Testing does not change treatment--> there is no medication that treats COVID-19  - Drink plenty of fluids and rest as much as needed  - May take over the counter medicine such as ibuprofen (aka Motrin/ Advil) or acetaminophen (aka Tylenol) for pain or fever, follow directions on label  - Continually monitor symptoms + contact a medical provider if symptoms are worsening, such as difficulty breathing  - Avoid exposure to environmental irritants/ allergens where possible    - Practice social distancing and wear a face mask when leaving home is necessary  - Symptoms may develop 2 days to 2 weeks following exposure to the virus- if you are exposed after testing, or if you were in the incubation period when tested, you could become ill with COVID-19 later    Keep a low threshold to go to ER or call 9-1-1 for new or suddenly worsening symptoms --> trouble breathing, high fevers that are unresponsive to fever-reducing medications, difficulty staying awake, vomiting/ unable to keep food or fluids down, any other symptoms that you think could be an emergency. On this date 11/8/2021 I have spent 15 minutes reviewing previous notes, test results and face to face with the patient discussing the diagnosis and importance of compliance with the treatment plan as well as documenting on the day of the visit. PATIENT REFERRED TO:  Return if symptoms worsen or fail to improve. DISCHARGE MEDICATIONS:  New Prescriptions    No medications on file     Cannot display discharge medications since this is not an admission.        Ash Pretty, [address]

## 2021-11-09 ASSESSMENT — ENCOUNTER SYMPTOMS
VOMITING: 0
BACK PAIN: 0
SHORTNESS OF BREATH: 0
DIARRHEA: 0
COUGH: 0
NAUSEA: 0
CHEST TIGHTNESS: 0
SINUS PAIN: 0
ABDOMINAL PAIN: 0
SINUS PRESSURE: 0
SORE THROAT: 0

## 2021-11-09 ASSESSMENT — VISUAL ACUITY: OU: 1

## 2021-11-10 LAB
SARS-COV-2: NOT DETECTED
SOURCE: NORMAL

## 2022-04-04 ENCOUNTER — OFFICE VISIT (OUTPATIENT)
Dept: OBGYN CLINIC | Age: 22
End: 2022-04-04
Payer: COMMERCIAL

## 2022-04-04 VITALS
BODY MASS INDEX: 18.58 KG/M2 | SYSTOLIC BLOOD PRESSURE: 98 MMHG | HEIGHT: 62 IN | WEIGHT: 101 LBS | DIASTOLIC BLOOD PRESSURE: 76 MMHG

## 2022-04-04 DIAGNOSIS — Z32.01 POSITIVE URINE PREGNANCY TEST: ICD-10-CM

## 2022-04-04 DIAGNOSIS — Z32.01 PREGNANCY TEST POSITIVE: ICD-10-CM

## 2022-04-04 DIAGNOSIS — N91.2 AMENORRHEA: ICD-10-CM

## 2022-04-04 DIAGNOSIS — Z11.3 SCREEN FOR STD (SEXUALLY TRANSMITTED DISEASE): ICD-10-CM

## 2022-04-04 DIAGNOSIS — R11.0 NAUSEA: ICD-10-CM

## 2022-04-04 DIAGNOSIS — N91.2 AMENORRHEA: Primary | ICD-10-CM

## 2022-04-04 LAB
GONADOTROPIN, CHORIONIC (HCG) QUANT: NORMAL MIU/ML
HCG, URINE, POC: POSITIVE
Lab: NORMAL
NEGATIVE QC PASS/FAIL: NORMAL
POSITIVE QC PASS/FAIL: NORMAL

## 2022-04-04 PROCEDURE — 81025 URINE PREGNANCY TEST: CPT | Performed by: OBSTETRICS & GYNECOLOGY

## 2022-04-04 PROCEDURE — 1036F TOBACCO NON-USER: CPT | Performed by: OBSTETRICS & GYNECOLOGY

## 2022-04-04 PROCEDURE — G8419 CALC BMI OUT NRM PARAM NOF/U: HCPCS | Performed by: OBSTETRICS & GYNECOLOGY

## 2022-04-04 PROCEDURE — 99202 OFFICE O/P NEW SF 15 MIN: CPT | Performed by: OBSTETRICS & GYNECOLOGY

## 2022-04-04 PROCEDURE — G8427 DOCREV CUR MEDS BY ELIG CLIN: HCPCS | Performed by: OBSTETRICS & GYNECOLOGY

## 2022-04-04 RX ORDER — VITAMIN A ACETATE, BETA CAROTENE, ASCORBIC ACID, CHOLECALCIFEROL, .ALPHA.-TOCOPHEROL ACETATE, DL-, THIAMINE MONONITRATE, RIBOFLAVIN, NIACINAMIDE, PYRIDOXINE HYDROCHLORIDE, FOLIC ACID, CYANOCOBALAMIN, CALCIUM CARBONATE, FERROUS FUMARATE, ZINC OXIDE, CUPRIC OXIDE 3080; 12; 120; 400; 1; 1.84; 3; 20; 22; 920; 25; 200; 27; 10; 2 [IU]/1; UG/1; MG/1; [IU]/1; MG/1; MG/1; MG/1; MG/1; MG/1; [IU]/1; MG/1; MG/1; MG/1; MG/1; MG/1
1 TABLET, FILM COATED ORAL DAILY
Qty: 90 TABLET | Refills: 4 | Status: SHIPPED | OUTPATIENT
Start: 2022-04-04 | End: 2022-08-29

## 2022-04-04 RX ORDER — ONDANSETRON 4 MG/1
4 TABLET, ORALLY DISINTEGRATING ORAL EVERY 8 HOURS PRN
Qty: 30 TABLET | Refills: 2 | Status: SHIPPED | OUTPATIENT
Start: 2022-04-04 | End: 2022-08-29

## 2022-04-04 RX ORDER — PNV NO.95/FERROUS FUM/FOLIC AC 28MG-0.8MG
1 TABLET ORAL DAILY
Qty: 30 TABLET | Refills: 3 | Status: SHIPPED | OUTPATIENT
Start: 2022-04-04

## 2022-04-04 RX ORDER — ONDANSETRON 4 MG/1
4 TABLET, FILM COATED ORAL DAILY PRN
Qty: 30 TABLET | Refills: 0 | Status: SHIPPED | OUTPATIENT
Start: 2022-04-04 | End: 2022-08-29

## 2022-04-04 NOTE — PROGRESS NOTES
SUBJECTIVE:   24 y.o.   female here to discuss positive pregnancy test. Pt denies any VB and pt without complaints today. PT does report daily nausea. Review of Systems:  General ROS: negative  Respiratory ROS: no cough, shortness of breath, or wheezing  Cardiovascular ROS: no chest pain or dyspnea on exertion  Gastrointestinal ROS: no abdominal pain, change in bowel habits, or black or bloody stools  Genito-Urinary ROS: no dysuria, trouble voiding, or hematuria    OBJECTIVE:   BP 98/76   Ht 5' 2\" (1.575 m)   Wt 101 lb (45.8 kg)   LMP 2022   BMI 18.47 kg/m²     Physical Exam:  GEN: She appears well, afebrile. HEENT: normal cephalic, atraumatic  CVS: regular rate and rhythm  ABDOMEN: benign, soft, nontender, no masses.   MUSCULOSKELETAL: normal gait, no masses  SKIN: normal texture and tone, no lesions    ASSESSMENT:   Positive pregnancy test    PLAN:   UPT positive  Hcg pending  US pending  Rx PNV and zofran to pharmacy   PT to f/u for IPV in 4wks

## 2022-04-06 LAB — URINE CULTURE, ROUTINE: NORMAL

## 2022-04-07 LAB
SPECIMEN SOURCE: NORMAL
T. VAGINALIS AMPLIFIED: NEGATIVE

## 2022-04-08 ENCOUNTER — HOSPITAL ENCOUNTER (OUTPATIENT)
Dept: ULTRASOUND IMAGING | Age: 22
Discharge: HOME | End: 2022-04-10
Payer: COMMERCIAL

## 2022-04-08 DIAGNOSIS — N91.2 AMENORRHEA: ICD-10-CM

## 2022-04-08 LAB
6-ACETYLMORPHINE: NOT DETECTED
7-AMINOCLONAZEPAM: NOT DETECTED
ALPHA-OH-ALPRAZOLAM: NOT DETECTED
ALPHA-OH-MIDAZOLAM, URINE: NOT DETECTED
ALPRAZOLAM: NOT DETECTED
AMPHETAMINE: NOT DETECTED
BARBITURATES: NOT DETECTED
BENZOYLECGONINE: NOT DETECTED
BUPRENORPHINE: NOT DETECTED
C. TRACHOMATIS DNA ,URINE: NEGATIVE
CARISOPRODOL: NOT DETECTED
CLONAZEPAM: NOT DETECTED
CODEINE: NOT DETECTED
CREATININE URINE: 208.5 MG/DL (ref 20–400)
DIAZEPAM: NOT DETECTED
EER PAIN MGT DRUG PANEL, HIGH RES/EMIT U: NORMAL
ETHYL GLUCURONIDE: NOT DETECTED
FENTANYL: NOT DETECTED
GABAPENTIN: NOT DETECTED
HYDROCODONE: NOT DETECTED
HYDROMORPHONE: NOT DETECTED
LORAZEPAM: NOT DETECTED
MARIJUANA METABOLITE: PRESENT
MDA: NOT DETECTED
MDEA: NOT DETECTED
MDMA URINE: NOT DETECTED
MEPERIDINE: NOT DETECTED
METHADONE: NOT DETECTED
METHAMPHETAMINE: NOT DETECTED
METHYLPHENIDATE: NOT DETECTED
MIDAZOLAM: NOT DETECTED
MORPHINE: NOT DETECTED
N. GONORRHOEAE DNA, URINE: NEGATIVE
NALOXONE: NOT DETECTED
NORBUPRENORPHINE, FREE: NOT DETECTED
NORDIAZEPAM: NOT DETECTED
NORFENTANYL: NOT DETECTED
NORHYDROCODONE, URINE: NOT DETECTED
NOROXYCODONE: NOT DETECTED
NOROXYMORPHONE, URINE: NOT DETECTED
OXAZEPAM: NOT DETECTED
OXYCODONE: NOT DETECTED
OXYMORPHONE: NOT DETECTED
PAIN MANAGEMENT DRUG PANEL: NORMAL
PCP: NOT DETECTED
PHENTERMINE: NOT DETECTED
PREGABALIN: NOT DETECTED
TAPENTADOL, URINE: NOT DETECTED
TAPENTADOL-O-SULFATE, URINE: NOT DETECTED
TEMAZEPAM: NOT DETECTED
TRAMADOL: NOT DETECTED
ZOLPIDEM: NOT DETECTED

## 2022-04-08 PROCEDURE — 76801 OB US < 14 WKS SINGLE FETUS: CPT

## 2022-05-02 ENCOUNTER — ROUTINE PRENATAL (OUTPATIENT)
Dept: OBGYN CLINIC | Age: 22
End: 2022-05-02
Payer: COMMERCIAL

## 2022-05-02 VITALS
DIASTOLIC BLOOD PRESSURE: 54 MMHG | BODY MASS INDEX: 18.11 KG/M2 | HEART RATE: 89 BPM | WEIGHT: 99 LBS | SYSTOLIC BLOOD PRESSURE: 92 MMHG

## 2022-05-02 DIAGNOSIS — Z3A.12 12 WEEKS GESTATION OF PREGNANCY: ICD-10-CM

## 2022-05-02 DIAGNOSIS — Z34.01 ENCOUNTER FOR PRENATAL CARE IN FIRST TRIMESTER OF FIRST PREGNANCY: ICD-10-CM

## 2022-05-02 DIAGNOSIS — Z3A.12 12 WEEKS GESTATION OF PREGNANCY: Primary | ICD-10-CM

## 2022-05-02 LAB
ABO/RH: NORMAL
ANTIBODY SCREEN: NORMAL
BASOPHILS ABSOLUTE: 0 K/UL (ref 0–0.2)
BASOPHILS RELATIVE PERCENT: 0.5 %
EOSINOPHILS ABSOLUTE: 0.5 K/UL (ref 0–0.7)
EOSINOPHILS RELATIVE PERCENT: 6 %
HCT VFR BLD CALC: 34.4 % (ref 37–47)
HEMOGLOBIN: 11.3 G/DL (ref 12–16)
HEPATITIS B SURFACE ANTIGEN INTERPRETATION: NORMAL
LYMPHOCYTES ABSOLUTE: 2 K/UL (ref 1–4.8)
LYMPHOCYTES RELATIVE PERCENT: 25.9 %
MCH RBC QN AUTO: 25.8 PG (ref 27–31.3)
MCHC RBC AUTO-ENTMCNC: 33 % (ref 33–37)
MCV RBC AUTO: 78.1 FL (ref 82–100)
MONOCYTES ABSOLUTE: 0.4 K/UL (ref 0.2–0.8)
MONOCYTES RELATIVE PERCENT: 5.4 %
NEUTROPHILS ABSOLUTE: 4.8 K/UL (ref 1.4–6.5)
NEUTROPHILS RELATIVE PERCENT: 62.2 %
PDW BLD-RTO: 12.9 % (ref 11.5–14.5)
PLATELET # BLD: 216 K/UL (ref 130–400)
RBC # BLD: 4.4 M/UL (ref 4.2–5.4)
RUBELLA ANTIBODY IGG: 346.5 IU/ML
WBC # BLD: 7.8 K/UL (ref 4.8–10.8)

## 2022-05-02 PROCEDURE — 1036F TOBACCO NON-USER: CPT | Performed by: OBSTETRICS & GYNECOLOGY

## 2022-05-02 PROCEDURE — G8419 CALC BMI OUT NRM PARAM NOF/U: HCPCS | Performed by: OBSTETRICS & GYNECOLOGY

## 2022-05-02 PROCEDURE — G8427 DOCREV CUR MEDS BY ELIG CLIN: HCPCS | Performed by: OBSTETRICS & GYNECOLOGY

## 2022-05-02 PROCEDURE — 99214 OFFICE O/P EST MOD 30 MIN: CPT | Performed by: OBSTETRICS & GYNECOLOGY

## 2022-05-02 NOTE — PROGRESS NOTES
SUBJECTIVE:   24 y.o.  female here for amenorrhea and new ob. Pt denies any VB and is tolerating po daily. Pt taking PNV. PT with early [address] with AURELIO c/w 11/10/22. Review of Systems:  General ROS: negative  Psychological ROS: negative  ENT ROS: negative  Endocrine ROS: negative  Respiratory ROS: no cough, shortness of breath, or wheezing  Cardiovascular ROS: no chest pain or dyspnea on exertion  Gastrointestinal ROS: no abdominal pain, change in bowel habits, or black or bloody stools  Genito-Urinary ROS: no dysuria, trouble voiding, or hematuria  Musculoskeletal ROS: negative  Neurological ROS: no TIA or stroke symptoms  Dermatological ROS: negative    OBJECTIVE:   BP (!) 92/54   Pulse 89   Wt 99 lb (44.9 kg)   LMP 2022   BMI 18.11 kg/m²     Physical Exam:  GEN: She appears well, afebrile. HEENT: normal cephalic, atraumatic  CVS: regular rate and rhythm  ABDOMEN: benign, soft, nontender, no masses. MUSCULOSKELETAL: normal gait, no masses  SKIN: normal texture and tone, no lesions  NEURO: normal tone, no hyperreflexia, 1+DTRs throughout    Pelvic Exam:   EFG: normal external genitalia  URETHRA: normal appearing without diverticula or lesions  VULVA: normal appearing vulva with no masses, tenderness or lesions  VAGINA: normal rugae, no discharge   CERVIX: parous, no lesions  UTERUS: uterus is normal shape, consistency and nontender - 10wks  ADNEXA: normal adnexa in size, nontender and no masses. PERINEUM: normal appearing without lesions or masses  ANUS: normal appearing without lesions or masses, no fissures or hemorrhoids    ASSESSMENT:   Supervision of pregnancy    PLAN:   Pap with GC/Chlam today  PN labs pending  US for anatomy at 18-20wks  Past medical, social and family history reviewed and updated in pt's chart.

## 2022-05-03 LAB
HEPATITIS C ANTIBODY: NONREACTIVE
HIV AG/AB: NONREACTIVE
RPR: NORMAL

## 2022-05-04 LAB — VZV IGG SER QL IA: 322.2 IV

## 2022-05-05 LAB
HPV COMMENT: ABNORMAL
HPV TYPE 16: NOT DETECTED
HPV TYPE 18: NOT DETECTED
HPVOH (OTHER TYPES): DETECTED

## 2022-05-10 LAB
CYSTIC FIBROSIS 165 VARIANTS INTERP: NORMAL
CYSTIC FIBROSIS 5T VARIANT: NORMAL
CYSTIC FIBROSIS ALLELE 1: NEGATIVE
CYSTIC FIBROSIS ALLELE 2: NEGATIVE

## 2022-05-16 ENCOUNTER — TELEPHONE (OUTPATIENT)
Dept: OBGYN CLINIC | Age: 22
End: 2022-05-16

## 2022-06-01 RX ORDER — ACETAMINOPHEN 650 MG/1
TABLET, FILM COATED, EXTENDED RELEASE ORAL
COMMUNITY
Start: 2022-02-23 | End: 2022-10-24 | Stop reason: ALTCHOICE

## 2022-06-01 RX ORDER — IBUPROFEN 800 MG/1
TABLET ORAL
COMMUNITY
Start: 2022-02-23 | End: 2022-10-24 | Stop reason: ALTCHOICE

## 2022-06-02 ENCOUNTER — ROUTINE PRENATAL (OUTPATIENT)
Dept: OBGYN CLINIC | Age: 22
End: 2022-06-02
Payer: COMMERCIAL

## 2022-06-02 VITALS
DIASTOLIC BLOOD PRESSURE: 72 MMHG | SYSTOLIC BLOOD PRESSURE: 102 MMHG | BODY MASS INDEX: 19.02 KG/M2 | HEART RATE: 102 BPM | WEIGHT: 104 LBS

## 2022-06-02 DIAGNOSIS — Z34.02 ENCOUNTER FOR SUPERVISION OF NORMAL FIRST PREGNANCY IN SECOND TRIMESTER: Primary | ICD-10-CM

## 2022-06-02 DIAGNOSIS — Z3A.17 17 WEEKS GESTATION OF PREGNANCY: ICD-10-CM

## 2022-06-02 PROCEDURE — G8427 DOCREV CUR MEDS BY ELIG CLIN: HCPCS | Performed by: OBSTETRICS & GYNECOLOGY

## 2022-06-02 PROCEDURE — 99213 OFFICE O/P EST LOW 20 MIN: CPT | Performed by: OBSTETRICS & GYNECOLOGY

## 2022-06-02 PROCEDURE — G8420 CALC BMI NORM PARAMETERS: HCPCS | Performed by: OBSTETRICS & GYNECOLOGY

## 2022-06-02 PROCEDURE — 1036F TOBACCO NON-USER: CPT | Performed by: OBSTETRICS & GYNECOLOGY

## 2022-06-02 ASSESSMENT — PATIENT HEALTH QUESTIONNAIRE - PHQ9
7. TROUBLE CONCENTRATING ON THINGS, SUCH AS READING THE NEWSPAPER OR WATCHING TELEVISION: 0
SUM OF ALL RESPONSES TO PHQ QUESTIONS 1-9: 0
10. IF YOU CHECKED OFF ANY PROBLEMS, HOW DIFFICULT HAVE THESE PROBLEMS MADE IT FOR YOU TO DO YOUR WORK, TAKE CARE OF THINGS AT HOME, OR GET ALONG WITH OTHER PEOPLE: 0
6. FEELING BAD ABOUT YOURSELF - OR THAT YOU ARE A FAILURE OR HAVE LET YOURSELF OR YOUR FAMILY DOWN: 0
3. TROUBLE FALLING OR STAYING ASLEEP: 0
SUM OF ALL RESPONSES TO PHQ QUESTIONS 1-9: 0
SUM OF ALL RESPONSES TO PHQ QUESTIONS 1-9: 0
8. MOVING OR SPEAKING SO SLOWLY THAT OTHER PEOPLE COULD HAVE NOTICED. OR THE OPPOSITE, BEING SO FIGETY OR RESTLESS THAT YOU HAVE BEEN MOVING AROUND A LOT MORE THAN USUAL: 0
1. LITTLE INTEREST OR PLEASURE IN DOING THINGS: 0
2. FEELING DOWN, DEPRESSED OR HOPELESS: 0
4. FEELING TIRED OR HAVING LITTLE ENERGY: 0
9. THOUGHTS THAT YOU WOULD BE BETTER OFF DEAD, OR OF HURTING YOURSELF: 0
SUM OF ALL RESPONSES TO PHQ QUESTIONS 1-9: 0
SUM OF ALL RESPONSES TO PHQ9 QUESTIONS 1 & 2: 0
5. POOR APPETITE OR OVEREATING: 0

## 2022-06-02 NOTE — PROGRESS NOTES
Patient's last menstrual period was 02/07/2022.   Please reference prenatal and OB flow chart for further information  PT here today for routine prenatal care  Pt denies vaginal bleeding  Pt without complaints  ROS:  Pt denies headache, dysuria, nausea/vomiting  PE:  /72   Pulse (!) 102   Wt 104 lb (47.2 kg)   LMP 02/07/2022   BMI 19.02 kg/m²   Gen - Alert and oriented x 3  HEENT- NC/AT, CVS - RRR, Lungs - CTAB  Abd - FH below umb  Appropriate fetal growth  PN labs reviewed - ABO OPos, NIPT normal (XX)  Pap NILM HR HPV positive  US for anatomy pending

## 2022-06-09 ENCOUNTER — HOSPITAL ENCOUNTER (OUTPATIENT)
Dept: ULTRASOUND IMAGING | Age: 22
Discharge: HOME | End: 2022-06-11
Payer: COMMERCIAL

## 2022-06-09 DIAGNOSIS — Z34.01 ENCOUNTER FOR PRENATAL CARE IN FIRST TRIMESTER OF FIRST PREGNANCY: ICD-10-CM

## 2022-06-09 DIAGNOSIS — Z3A.12 12 WEEKS GESTATION OF PREGNANCY: ICD-10-CM

## 2022-06-09 PROCEDURE — 76805 OB US >/= 14 WKS SNGL FETUS: CPT

## 2022-06-20 ENCOUNTER — ROUTINE PRENATAL (OUTPATIENT)
Dept: OBGYN CLINIC | Age: 22
End: 2022-06-20
Payer: COMMERCIAL

## 2022-06-20 ENCOUNTER — TELEPHONE (OUTPATIENT)
Dept: OBGYN CLINIC | Age: 22
End: 2022-06-20

## 2022-06-20 VITALS
TEMPERATURE: 97.9 F | HEART RATE: 73 BPM | WEIGHT: 106.8 LBS | RESPIRATION RATE: 16 BRPM | BODY MASS INDEX: 19.53 KG/M2 | SYSTOLIC BLOOD PRESSURE: 102 MMHG | DIASTOLIC BLOOD PRESSURE: 68 MMHG

## 2022-06-20 DIAGNOSIS — Z3A.20 20 WEEKS GESTATION OF PREGNANCY: Primary | ICD-10-CM

## 2022-06-20 DIAGNOSIS — Z34.02 ENCOUNTER FOR SUPERVISION OF NORMAL FIRST PREGNANCY IN SECOND TRIMESTER: Primary | ICD-10-CM

## 2022-06-20 DIAGNOSIS — Z3A.19 19 WEEKS GESTATION OF PREGNANCY: ICD-10-CM

## 2022-06-20 PROCEDURE — G8427 DOCREV CUR MEDS BY ELIG CLIN: HCPCS | Performed by: OBSTETRICS & GYNECOLOGY

## 2022-06-20 PROCEDURE — 1036F TOBACCO NON-USER: CPT | Performed by: OBSTETRICS & GYNECOLOGY

## 2022-06-20 PROCEDURE — G8420 CALC BMI NORM PARAMETERS: HCPCS | Performed by: OBSTETRICS & GYNECOLOGY

## 2022-06-20 PROCEDURE — 99213 OFFICE O/P EST LOW 20 MIN: CPT | Performed by: OBSTETRICS & GYNECOLOGY

## 2022-06-20 NOTE — PROGRESS NOTES
Patient's last menstrual period was 02/07/2022.   Please reference prenatal and OB flow chart for further information  PT here today for routine prenatal care  Pt endorses fetal movement and denies loss of fluid, contractions or vaginal bleeding  Pt without complaints  ROS:  Pt denies headache, dysuria, nausea/vomiting  PE:  /68 (Site: Left Upper Arm, Position: Sitting, Cuff Size: Small Adult)   Pulse 73   Temp 97.9 °F (36.6 °C) (Temporal)   Resp 16   Wt 106 lb 12.8 oz (48.4 kg)   LMP 02/07/2022   BMI 19.53 kg/m²   Gen - Alert and oriented x 3  HEENT- NC/AT, CVS - RRR, Lungs - CTAB  Abd - FH @ umb  Appropriate fetal growth  US reviewed - repeat US to complete anatomy scan pending

## 2022-07-01 ENCOUNTER — HOSPITAL ENCOUNTER (OUTPATIENT)
Dept: ULTRASOUND IMAGING | Age: 22
Discharge: HOME | End: 2022-07-03
Payer: COMMERCIAL

## 2022-07-01 DIAGNOSIS — Z3A.20 20 WEEKS GESTATION OF PREGNANCY: ICD-10-CM

## 2022-07-01 PROCEDURE — 76805 OB US >/= 14 WKS SNGL FETUS: CPT

## 2022-07-18 ENCOUNTER — ROUTINE PRENATAL (OUTPATIENT)
Dept: OBGYN CLINIC | Age: 22
End: 2022-07-18
Payer: COMMERCIAL

## 2022-07-18 VITALS
SYSTOLIC BLOOD PRESSURE: 100 MMHG | BODY MASS INDEX: 19.94 KG/M2 | HEART RATE: 74 BPM | WEIGHT: 109 LBS | DIASTOLIC BLOOD PRESSURE: 60 MMHG

## 2022-07-18 DIAGNOSIS — Z3A.23 23 WEEKS GESTATION OF PREGNANCY: ICD-10-CM

## 2022-07-18 DIAGNOSIS — Z34.02 ENCOUNTER FOR SUPERVISION OF NORMAL FIRST PREGNANCY IN SECOND TRIMESTER: Primary | ICD-10-CM

## 2022-07-18 PROCEDURE — 99213 OFFICE O/P EST LOW 20 MIN: CPT | Performed by: OBSTETRICS & GYNECOLOGY

## 2022-07-18 NOTE — PROGRESS NOTES
Patient's last menstrual period was 02/07/2022.   Please reference prenatal and OB flow chart for further information  PT here today for routine prenatal care  Pt endorses fetal movement and denies loss of fluid, contractions or vaginal bleeding  Pt without complaints  ROS:  Pt denies headache, dysuria, nausea/vomiting  PE:  /60   Pulse 74   Wt 109 lb (49.4 kg)   LMP 02/07/2022   BMI 19.94 kg/m²   Gen - Alert and oriented x 3  HEENT- NC/AT, CVS - RRR, Lungs - CTAB  Abd - FH 24  Appropriate fetal growth  1hr at next apt  Us reviewed - repeat at 28-30wks

## 2022-08-15 ENCOUNTER — ROUTINE PRENATAL (OUTPATIENT)
Dept: OBGYN CLINIC | Age: 22
End: 2022-08-15
Payer: COMMERCIAL

## 2022-08-15 VITALS
DIASTOLIC BLOOD PRESSURE: 60 MMHG | HEART RATE: 94 BPM | WEIGHT: 114 LBS | SYSTOLIC BLOOD PRESSURE: 100 MMHG | BODY MASS INDEX: 20.85 KG/M2

## 2022-08-15 DIAGNOSIS — Z34.02 ENCOUNTER FOR SUPERVISION OF NORMAL FIRST PREGNANCY IN SECOND TRIMESTER: ICD-10-CM

## 2022-08-15 DIAGNOSIS — Z3A.27 27 WEEKS GESTATION OF PREGNANCY: Primary | ICD-10-CM

## 2022-08-15 DIAGNOSIS — Z3A.27 27 WEEKS GESTATION OF PREGNANCY: ICD-10-CM

## 2022-08-15 LAB
GLUCOSE, 1HR PP: 142 MG/DL (ref 60–140)
HCT VFR BLD CALC: 31.7 % (ref 37–47)
HEMOGLOBIN: 10.5 G/DL (ref 12–16)

## 2022-08-15 PROCEDURE — G8427 DOCREV CUR MEDS BY ELIG CLIN: HCPCS | Performed by: OBSTETRICS & GYNECOLOGY

## 2022-08-15 PROCEDURE — G8420 CALC BMI NORM PARAMETERS: HCPCS | Performed by: OBSTETRICS & GYNECOLOGY

## 2022-08-15 PROCEDURE — 1036F TOBACCO NON-USER: CPT | Performed by: OBSTETRICS & GYNECOLOGY

## 2022-08-15 PROCEDURE — 99213 OFFICE O/P EST LOW 20 MIN: CPT | Performed by: OBSTETRICS & GYNECOLOGY

## 2022-08-15 NOTE — PROGRESS NOTES
Patient's last menstrual period was 02/07/2022.   Please reference prenatal and OB flow chart for further information  PT here today for routine prenatal care  Pt endorses fetal movement and denies loss of fluid, contractions or vaginal bleeding  Pt without complaints  ROS:  Pt denies headache, dysuria, nausea/vomiting  PE:  /60   Pulse 94   Wt 114 lb (51.7 kg)   LMP 02/07/2022   BMI 20.85 kg/m²   Gen - Alert and oriented x 3  HEENT- NC/AT, CVS - RRR, Lungs - CTAB  Abd - FH 28  Appropriate fetal growth  1hr and US pending  Tdap today

## 2022-08-16 LAB — RPR: NORMAL

## 2022-08-17 DIAGNOSIS — R73.09 ELEVATED GLUCOSE TOLERANCE TEST: Primary | ICD-10-CM

## 2022-08-22 DIAGNOSIS — R73.09 ELEVATED GLUCOSE TOLERANCE TEST: Primary | ICD-10-CM

## 2022-08-22 DIAGNOSIS — R73.09 ELEVATED GLUCOSE TOLERANCE TEST: ICD-10-CM

## 2022-08-22 LAB — HBA1C MFR BLD: 4.3 % (ref 4.8–5.9)

## 2022-08-24 ENCOUNTER — HOSPITAL ENCOUNTER (OUTPATIENT)
Dept: ULTRASOUND IMAGING | Age: 22
Discharge: HOME | End: 2022-08-26
Payer: COMMERCIAL

## 2022-08-24 DIAGNOSIS — Z3A.27 27 WEEKS GESTATION OF PREGNANCY: ICD-10-CM

## 2022-08-24 PROCEDURE — 76815 OB US LIMITED FETUS(S): CPT

## 2022-08-29 ENCOUNTER — ROUTINE PRENATAL (OUTPATIENT)
Dept: OBGYN CLINIC | Age: 22
End: 2022-08-29
Payer: COMMERCIAL

## 2022-08-29 VITALS
WEIGHT: 116 LBS | HEART RATE: 92 BPM | DIASTOLIC BLOOD PRESSURE: 72 MMHG | BODY MASS INDEX: 21.22 KG/M2 | SYSTOLIC BLOOD PRESSURE: 102 MMHG

## 2022-08-29 DIAGNOSIS — Z3A.29 29 WEEKS GESTATION OF PREGNANCY: ICD-10-CM

## 2022-08-29 DIAGNOSIS — Z34.03 ENCOUNTER FOR PRENATAL CARE IN THIRD TRIMESTER OF FIRST PREGNANCY: Primary | ICD-10-CM

## 2022-08-29 PROCEDURE — 1036F TOBACCO NON-USER: CPT | Performed by: OBSTETRICS & GYNECOLOGY

## 2022-08-29 PROCEDURE — 99213 OFFICE O/P EST LOW 20 MIN: CPT | Performed by: OBSTETRICS & GYNECOLOGY

## 2022-08-29 PROCEDURE — G8427 DOCREV CUR MEDS BY ELIG CLIN: HCPCS | Performed by: OBSTETRICS & GYNECOLOGY

## 2022-08-29 PROCEDURE — G8420 CALC BMI NORM PARAMETERS: HCPCS | Performed by: OBSTETRICS & GYNECOLOGY

## 2022-08-29 RX ORDER — CEPHALEXIN 500 MG/1
500 CAPSULE ORAL 2 TIMES DAILY
Qty: 14 CAPSULE | Refills: 0 | Status: SHIPPED | OUTPATIENT
Start: 2022-08-29 | End: 2022-09-05

## 2022-08-29 NOTE — PROGRESS NOTES
Patient's last menstrual period was 02/07/2022.   Please reference prenatal and OB flow chart for further information  PT here today for routine prenatal care  Pt endorses fetal movement and denies loss of fluid, contractions or vaginal bleeding  Pt without complaints  ROS:  Pt denies headache, dysuria, nausea/vomiting  PE:  /72   Pulse 92   Wt 116 lb (52.6 kg)   LMP 02/07/2022   BMI 21.22 kg/m²   Gen - Alert and oriented x 3  HEENT- NC/AT, CVS - RRR, Lungs - CTAB  Abd - FH 30  Appropriate fetal growth  1hr 142, hgb 10.5  US reviewed - ceph/trans, LYNNE 16, EFW 11%  Us pending  Rx keflex to pharmacy

## 2022-09-12 ENCOUNTER — ROUTINE PRENATAL (OUTPATIENT)
Dept: OBGYN CLINIC | Age: 22
End: 2022-09-12
Payer: COMMERCIAL

## 2022-09-12 VITALS
BODY MASS INDEX: 21.58 KG/M2 | DIASTOLIC BLOOD PRESSURE: 60 MMHG | WEIGHT: 118 LBS | HEART RATE: 90 BPM | SYSTOLIC BLOOD PRESSURE: 90 MMHG

## 2022-09-12 DIAGNOSIS — Z34.83 ENCOUNTER FOR SUPERVISION OF OTHER NORMAL PREGNANCY IN THIRD TRIMESTER: Primary | ICD-10-CM

## 2022-09-12 DIAGNOSIS — Z3A.31 31 WEEKS GESTATION OF PREGNANCY: ICD-10-CM

## 2022-09-12 PROCEDURE — G8420 CALC BMI NORM PARAMETERS: HCPCS | Performed by: OBSTETRICS & GYNECOLOGY

## 2022-09-12 PROCEDURE — 1036F TOBACCO NON-USER: CPT | Performed by: OBSTETRICS & GYNECOLOGY

## 2022-09-12 PROCEDURE — 99213 OFFICE O/P EST LOW 20 MIN: CPT | Performed by: OBSTETRICS & GYNECOLOGY

## 2022-09-12 PROCEDURE — G8427 DOCREV CUR MEDS BY ELIG CLIN: HCPCS | Performed by: OBSTETRICS & GYNECOLOGY

## 2022-09-12 NOTE — PROGRESS NOTES
Patient's last menstrual period was 02/07/2022.   Please reference prenatal and OB flow chart for further information  PT here today for routine prenatal care  Pt endorses fetal movement and denies loss of fluid, contractions or vaginal bleeding  Pt without complaints  ROS:  Pt denies headache, dysuria, nausea/vomiting  PE:  BP 90/60   Pulse 90   Wt 118 lb (53.5 kg)   LMP 02/07/2022   BMI 21.58 kg/m²   Gen - Alert and oriented x 3  HEENT- NC/AT, CVS - RRR, Lungs - CTAB  Abd - FH 30  Appropriate fetal growth  HgA1C 4.3 (nml)  US at 36wks for presentation (trans at last [address])

## 2022-09-26 ENCOUNTER — ROUTINE PRENATAL (OUTPATIENT)
Dept: OBGYN CLINIC | Age: 22
End: 2022-09-26
Payer: COMMERCIAL

## 2022-09-26 VITALS
HEART RATE: 100 BPM | BODY MASS INDEX: 21.58 KG/M2 | DIASTOLIC BLOOD PRESSURE: 60 MMHG | SYSTOLIC BLOOD PRESSURE: 112 MMHG | WEIGHT: 118 LBS

## 2022-09-26 DIAGNOSIS — Z34.03 ENCOUNTER FOR SUPERVISION OF NORMAL FIRST PREGNANCY IN THIRD TRIMESTER: Primary | ICD-10-CM

## 2022-09-26 DIAGNOSIS — Z3A.33 33 WEEKS GESTATION OF PREGNANCY: ICD-10-CM

## 2022-09-26 PROCEDURE — 99213 OFFICE O/P EST LOW 20 MIN: CPT | Performed by: OBSTETRICS & GYNECOLOGY

## 2022-09-26 PROCEDURE — G8427 DOCREV CUR MEDS BY ELIG CLIN: HCPCS | Performed by: OBSTETRICS & GYNECOLOGY

## 2022-09-26 PROCEDURE — 1036F TOBACCO NON-USER: CPT | Performed by: OBSTETRICS & GYNECOLOGY

## 2022-09-26 PROCEDURE — G8420 CALC BMI NORM PARAMETERS: HCPCS | Performed by: OBSTETRICS & GYNECOLOGY

## 2022-09-26 NOTE — PROGRESS NOTES
Patient's last menstrual period was 02/07/2022.   Please reference prenatal and OB flow chart for further information  PT here today for routine prenatal care  Pt endorses fetal movement and denies loss of fluid, contractions or vaginal bleeding  Pt without complaints  ROS:  Pt denies headache, dysuria, nausea/vomiting  PE:  /60   Pulse 100   Wt 118 lb (53.5 kg)   LMP 02/07/2022   BMI 21.58 kg/m²   Gen - Alert and oriented x 3  HEENT- NC/AT, CVS - RRR, Lungs - CTAB  Abd - FH 34  Appropriate fetal growth  US at 36wks for presentation pending

## 2022-10-10 ENCOUNTER — ROUTINE PRENATAL (OUTPATIENT)
Dept: OBGYN CLINIC | Age: 22
End: 2022-10-10
Payer: COMMERCIAL

## 2022-10-10 VITALS
DIASTOLIC BLOOD PRESSURE: 68 MMHG | SYSTOLIC BLOOD PRESSURE: 124 MMHG | WEIGHT: 121 LBS | HEART RATE: 93 BPM | BODY MASS INDEX: 22.13 KG/M2

## 2022-10-10 DIAGNOSIS — Z34.03 ENCOUNTER FOR PRENATAL CARE IN THIRD TRIMESTER OF FIRST PREGNANCY: Primary | ICD-10-CM

## 2022-10-10 DIAGNOSIS — Z3A.35 35 WEEKS GESTATION OF PREGNANCY: ICD-10-CM

## 2022-10-10 PROCEDURE — 99213 OFFICE O/P EST LOW 20 MIN: CPT | Performed by: OBSTETRICS & GYNECOLOGY

## 2022-10-10 PROCEDURE — G8484 FLU IMMUNIZE NO ADMIN: HCPCS | Performed by: OBSTETRICS & GYNECOLOGY

## 2022-10-10 PROCEDURE — 1036F TOBACCO NON-USER: CPT | Performed by: OBSTETRICS & GYNECOLOGY

## 2022-10-10 PROCEDURE — G8420 CALC BMI NORM PARAMETERS: HCPCS | Performed by: OBSTETRICS & GYNECOLOGY

## 2022-10-10 PROCEDURE — G8427 DOCREV CUR MEDS BY ELIG CLIN: HCPCS | Performed by: OBSTETRICS & GYNECOLOGY

## 2022-10-10 NOTE — PROGRESS NOTES
Patient's last menstrual period was 02/07/2022.   Please reference prenatal and OB flow chart for further information  PT here today for routine prenatal care  Pt endorses fetal movement and denies loss of fluid, contractions or vaginal bleeding  Pt without complaints  ROS:  Pt denies headache, dysuria, nausea/vomiting  PE:  /68   Pulse 93   Wt 121 lb (54.9 kg)   LMP 02/07/2022   Breastfeeding Unknown   BMI 22.13 kg/m²   Gen - Alert and oriented x 3  HEENT- NC/AT, CVS - RRR, Lungs - CTAB  Abd - FH 36  Appropriate fetal growth  GBS at next apt  Us for presentation pending

## 2022-10-17 ENCOUNTER — ROUTINE PRENATAL (OUTPATIENT)
Dept: OBGYN CLINIC | Age: 22
End: 2022-10-17
Payer: COMMERCIAL

## 2022-10-17 VITALS
HEART RATE: 83 BPM | BODY MASS INDEX: 22.5 KG/M2 | SYSTOLIC BLOOD PRESSURE: 120 MMHG | DIASTOLIC BLOOD PRESSURE: 72 MMHG | WEIGHT: 123 LBS

## 2022-10-17 DIAGNOSIS — Z34.03 ENCOUNTER FOR SUPERVISION OF NORMAL FIRST PREGNANCY IN THIRD TRIMESTER: Primary | ICD-10-CM

## 2022-10-17 DIAGNOSIS — Z3A.36 36 WEEKS GESTATION OF PREGNANCY: ICD-10-CM

## 2022-10-17 DIAGNOSIS — Z34.03 ENCOUNTER FOR SUPERVISION OF NORMAL FIRST PREGNANCY IN THIRD TRIMESTER: ICD-10-CM

## 2022-10-17 PROCEDURE — 99213 OFFICE O/P EST LOW 20 MIN: CPT | Performed by: OBSTETRICS & GYNECOLOGY

## 2022-10-17 PROCEDURE — 90715 TDAP VACCINE 7 YRS/> IM: CPT | Performed by: OBSTETRICS & GYNECOLOGY

## 2022-10-17 PROCEDURE — 90471 IMMUNIZATION ADMIN: CPT | Performed by: OBSTETRICS & GYNECOLOGY

## 2022-10-17 PROCEDURE — 1036F TOBACCO NON-USER: CPT | Performed by: OBSTETRICS & GYNECOLOGY

## 2022-10-17 PROCEDURE — G8427 DOCREV CUR MEDS BY ELIG CLIN: HCPCS | Performed by: OBSTETRICS & GYNECOLOGY

## 2022-10-17 PROCEDURE — G8484 FLU IMMUNIZE NO ADMIN: HCPCS | Performed by: OBSTETRICS & GYNECOLOGY

## 2022-10-17 PROCEDURE — G8420 CALC BMI NORM PARAMETERS: HCPCS | Performed by: OBSTETRICS & GYNECOLOGY

## 2022-10-17 NOTE — PROGRESS NOTES
Patient's last menstrual period was 02/07/2022.   Please reference prenatal and OB flow chart for further information  PT here today for routine prenatal care  Pt endorses fetal movement and denies loss of fluid, contractions or vaginal bleeding  Pt without complaints  ROS:  Pt denies headache, dysuria, nausea/vomiting  PE:  /72   Pulse 83   Wt 123 lb (55.8 kg)   LMP 02/07/2022   BMI 22.50 kg/m²   Gen - Alert and oriented x 3  HEENT- NC/AT, CVS - RRR, Lungs - CTAB  Abd - FH 36  Appropriate fetal growth  GBS pending  US for presentation pending  Tdap today

## 2022-10-19 ENCOUNTER — HOSPITAL ENCOUNTER (OUTPATIENT)
Dept: ULTRASOUND IMAGING | Age: 22
Discharge: HOME | End: 2022-10-21
Payer: COMMERCIAL

## 2022-10-19 DIAGNOSIS — Z3A.33 33 WEEKS GESTATION OF PREGNANCY: ICD-10-CM

## 2022-10-19 DIAGNOSIS — Z34.03 ENCOUNTER FOR SUPERVISION OF NORMAL FIRST PREGNANCY IN THIRD TRIMESTER: ICD-10-CM

## 2022-10-19 LAB
GROUP B STREP CULTURE: ABNORMAL
GROUP B STREP CULTURE: ABNORMAL
ORGANISM: ABNORMAL

## 2022-10-19 PROCEDURE — 76815 OB US LIMITED FETUS(S): CPT

## 2022-10-24 ENCOUNTER — ROUTINE PRENATAL (OUTPATIENT)
Dept: OBGYN CLINIC | Age: 22
End: 2022-10-24
Payer: COMMERCIAL

## 2022-10-24 VITALS
SYSTOLIC BLOOD PRESSURE: 108 MMHG | DIASTOLIC BLOOD PRESSURE: 70 MMHG | BODY MASS INDEX: 22.31 KG/M2 | WEIGHT: 122 LBS | HEART RATE: 90 BPM

## 2022-10-24 DIAGNOSIS — Z3A.37 37 WEEKS GESTATION OF PREGNANCY: ICD-10-CM

## 2022-10-24 DIAGNOSIS — Z34.03 ENCOUNTER FOR PRENATAL CARE IN THIRD TRIMESTER OF FIRST PREGNANCY: Primary | ICD-10-CM

## 2022-10-24 PROCEDURE — G8427 DOCREV CUR MEDS BY ELIG CLIN: HCPCS | Performed by: OBSTETRICS & GYNECOLOGY

## 2022-10-24 PROCEDURE — 1036F TOBACCO NON-USER: CPT | Performed by: OBSTETRICS & GYNECOLOGY

## 2022-10-24 PROCEDURE — G8484 FLU IMMUNIZE NO ADMIN: HCPCS | Performed by: OBSTETRICS & GYNECOLOGY

## 2022-10-24 PROCEDURE — G8420 CALC BMI NORM PARAMETERS: HCPCS | Performed by: OBSTETRICS & GYNECOLOGY

## 2022-10-24 PROCEDURE — 99213 OFFICE O/P EST LOW 20 MIN: CPT | Performed by: OBSTETRICS & GYNECOLOGY

## 2022-10-24 NOTE — PROGRESS NOTES
Patient's last menstrual period was 02/07/2022.   Please reference prenatal and OB flow chart for further information  PT here today for routine prenatal care  Pt endorses fetal movement and denies loss of fluid, contractions or vaginal bleeding  Pt without complaints  ROS:  Pt denies headache, dysuria, nausea/vomiting  PE:  /70   Pulse 90   Wt 122 lb (55.3 kg)   LMP 02/07/2022   BMI 22.31 kg/m²   Gen - Alert and oriented x 3  HEENT- NC/AT, CVS - RRR, Lungs - CTAB  Abd - FH 38  Appropriate fetal growth  GBS positive  US reviewed- ceph, EFW 13%

## 2022-10-31 ENCOUNTER — ROUTINE PRENATAL (OUTPATIENT)
Dept: OBGYN CLINIC | Age: 22
End: 2022-10-31
Payer: COMMERCIAL

## 2022-10-31 VITALS
HEART RATE: 85 BPM | SYSTOLIC BLOOD PRESSURE: 106 MMHG | DIASTOLIC BLOOD PRESSURE: 58 MMHG | BODY MASS INDEX: 22.13 KG/M2 | WEIGHT: 121 LBS

## 2022-10-31 DIAGNOSIS — Z3A.38 38 WEEKS GESTATION OF PREGNANCY: ICD-10-CM

## 2022-10-31 DIAGNOSIS — Z34.03 ENCOUNTER FOR PRENATAL CARE IN THIRD TRIMESTER OF FIRST PREGNANCY: Primary | ICD-10-CM

## 2022-10-31 PROCEDURE — 99213 OFFICE O/P EST LOW 20 MIN: CPT | Performed by: OBSTETRICS & GYNECOLOGY

## 2022-10-31 PROCEDURE — G8427 DOCREV CUR MEDS BY ELIG CLIN: HCPCS | Performed by: OBSTETRICS & GYNECOLOGY

## 2022-10-31 PROCEDURE — G8484 FLU IMMUNIZE NO ADMIN: HCPCS | Performed by: OBSTETRICS & GYNECOLOGY

## 2022-10-31 PROCEDURE — G8420 CALC BMI NORM PARAMETERS: HCPCS | Performed by: OBSTETRICS & GYNECOLOGY

## 2022-10-31 PROCEDURE — 1036F TOBACCO NON-USER: CPT | Performed by: OBSTETRICS & GYNECOLOGY

## 2022-10-31 NOTE — PROGRESS NOTES
Patient's last menstrual period was 02/07/2022.   Please reference prenatal and OB flow chart for further information  PT here today for routine prenatal care  Pt endorses fetal movement and denies loss of fluid, contractions or vaginal bleeding  Pt without complaints  ROS:  Pt denies headache, dysuria, nausea/vomiting  PE:  BP (!) 106/58   Pulse 85   Wt 121 lb (54.9 kg)   LMP 02/07/2022   BMI 22.13 kg/m²   Gen - Alert and oriented x 3  HEENT- NC/AT, CVS - RRR, Lungs - CTAB  Abd - FH 38  Appropriate fetal growth  GBS positive  Cx: 1/50/-2  Pt may be interested in induction if no labor by next week

## 2022-11-07 ENCOUNTER — ROUTINE PRENATAL (OUTPATIENT)
Dept: OBGYN CLINIC | Age: 22
End: 2022-11-07
Payer: COMMERCIAL

## 2022-11-07 VITALS
HEART RATE: 92 BPM | DIASTOLIC BLOOD PRESSURE: 76 MMHG | BODY MASS INDEX: 22.68 KG/M2 | WEIGHT: 124 LBS | SYSTOLIC BLOOD PRESSURE: 118 MMHG

## 2022-11-07 DIAGNOSIS — Z3A.39 39 WEEKS GESTATION OF PREGNANCY: ICD-10-CM

## 2022-11-07 DIAGNOSIS — Z34.03 ENCOUNTER FOR PRENATAL CARE IN THIRD TRIMESTER OF FIRST PREGNANCY: Primary | ICD-10-CM

## 2022-11-07 PROCEDURE — G8420 CALC BMI NORM PARAMETERS: HCPCS | Performed by: OBSTETRICS & GYNECOLOGY

## 2022-11-07 PROCEDURE — G8484 FLU IMMUNIZE NO ADMIN: HCPCS | Performed by: OBSTETRICS & GYNECOLOGY

## 2022-11-07 PROCEDURE — 1036F TOBACCO NON-USER: CPT | Performed by: OBSTETRICS & GYNECOLOGY

## 2022-11-07 PROCEDURE — G8427 DOCREV CUR MEDS BY ELIG CLIN: HCPCS | Performed by: OBSTETRICS & GYNECOLOGY

## 2022-11-07 PROCEDURE — 99213 OFFICE O/P EST LOW 20 MIN: CPT | Performed by: OBSTETRICS & GYNECOLOGY

## 2022-11-07 NOTE — PROGRESS NOTES
Patient's last menstrual period was 02/07/2022.   Please reference prenatal and OB flow chart for further information  PT here today for routine prenatal care  Pt endorses fetal movement and denies loss of fluid, contractions or vaginal bleeding  Pt without complaints  ROS:  Pt denies headache, dysuria, nausea/vomiting  PE:  /76   Pulse 92   Wt 124 lb (56.2 kg)   LMP 02/07/2022   BMI 22.68 kg/m²   Gen - Alert and oriented x 3  HEENT- NC/AT, CVS - RRR, Lungs - CTAB  Abd - FH 38  Appropriate fetal growth  GBS positive  Cx: 1/50/-2  Labor precautions given

## 2022-11-10 ENCOUNTER — HOSPITAL ENCOUNTER (INPATIENT)
Age: 22
LOS: 2 days | Discharge: HOME | DRG: 560 | End: 2022-11-12
Attending: OBSTETRICS & GYNECOLOGY | Admitting: OBSTETRICS & GYNECOLOGY
Payer: COMMERCIAL

## 2022-11-10 ENCOUNTER — ANESTHESIA EVENT (OUTPATIENT)
Dept: LABOR AND DELIVERY | Age: 22
DRG: 560 | End: 2022-11-10
Payer: COMMERCIAL

## 2022-11-10 ENCOUNTER — ANESTHESIA (OUTPATIENT)
Dept: LABOR AND DELIVERY | Age: 22
DRG: 560 | End: 2022-11-10
Payer: COMMERCIAL

## 2022-11-10 LAB
ABO/RH: NORMAL
ALBUMIN SERPL-MCNC: 3.5 G/DL (ref 3.5–4.6)
ALP BLD-CCNC: 272 U/L (ref 40–130)
ALT SERPL-CCNC: 8 U/L (ref 0–33)
AMPHETAMINE SCREEN, URINE: NORMAL
ANION GAP SERPL CALCULATED.3IONS-SCNC: 12 MEQ/L (ref 9–15)
ANTIBODY SCREEN: NORMAL
AST SERPL-CCNC: 25 U/L (ref 0–35)
BACTERIA: ABNORMAL /HPF
BARBITURATE SCREEN URINE: NORMAL
BASOPHILS ABSOLUTE: 0 K/UL (ref 0–0.2)
BASOPHILS RELATIVE PERCENT: 0.4 %
BENZODIAZEPINE SCREEN, URINE: NORMAL
BILIRUB SERPL-MCNC: 0.3 MG/DL (ref 0.2–0.7)
BILIRUBIN URINE: NEGATIVE
BLOOD, URINE: ABNORMAL
BUN BLDV-MCNC: 4 MG/DL (ref 6–20)
CALCIUM SERPL-MCNC: 9 MG/DL (ref 8.5–9.9)
CANNABINOID SCREEN URINE: NORMAL
CHLORIDE BLD-SCNC: 102 MEQ/L (ref 95–107)
CLARITY: ABNORMAL
CO2: 19 MEQ/L (ref 20–31)
COCAINE METABOLITE SCREEN URINE: NORMAL
COLOR: YELLOW
CREAT SERPL-MCNC: 0.76 MG/DL (ref 0.5–0.9)
CRYSTALS, UA: ABNORMAL /HPF
EOSINOPHILS ABSOLUTE: 0.4 K/UL (ref 0–0.7)
EOSINOPHILS RELATIVE PERCENT: 3.8 %
EPITHELIAL CELLS, UA: ABNORMAL /HPF
FENTANYL SCREEN, URINE: NORMAL
GFR SERPL CREATININE-BSD FRML MDRD: >60 ML/MIN/{1.73_M2}
GLOBULIN: 3.1 G/DL (ref 2.3–3.5)
GLUCOSE BLD-MCNC: 84 MG/DL (ref 70–99)
GLUCOSE URINE: NEGATIVE MG/DL
HCT VFR BLD CALC: 35 % (ref 37–47)
HEMOGLOBIN: 11.5 G/DL (ref 12–16)
KETONES, URINE: NEGATIVE MG/DL
LEUKOCYTE ESTERASE, URINE: ABNORMAL
LYMPHOCYTES ABSOLUTE: 2.2 K/UL (ref 1–4.8)
LYMPHOCYTES RELATIVE PERCENT: 18.8 %
Lab: NORMAL
MCH RBC QN AUTO: 26 PG (ref 27–31.3)
MCHC RBC AUTO-ENTMCNC: 33 % (ref 33–37)
MCV RBC AUTO: 78.8 FL (ref 79.4–94.8)
METHADONE SCREEN, URINE: NORMAL
MONOCYTES ABSOLUTE: 0.8 K/UL (ref 0.2–0.8)
MONOCYTES RELATIVE PERCENT: 6.5 %
NEUTROPHILS ABSOLUTE: 8.2 K/UL (ref 1.4–6.5)
NEUTROPHILS RELATIVE PERCENT: 70.5 %
NITRITE, URINE: NEGATIVE
OPIATE SCREEN URINE: NORMAL
OXYCODONE URINE: NORMAL
PDW BLD-RTO: 13.1 % (ref 11.5–14.5)
PH UA: 6.5 (ref 5–9)
PHENCYCLIDINE SCREEN URINE: NORMAL
PLACENTA ALPHA MICROGLOBULIN-1: POSITIVE
PLATELET # BLD: 174 K/UL (ref 130–400)
POTASSIUM SERPL-SCNC: 4 MEQ/L (ref 3.4–4.9)
PROPOXYPHENE SCREEN: NORMAL
PROTEIN UA: ABNORMAL MG/DL
RBC # BLD: 4.43 M/UL (ref 4.2–5.4)
RBC UA: ABNORMAL /HPF (ref 0–2)
RPR: NORMAL
RUBELLA ANTIBODY IGG: 278.3 IU/ML
SARS-COV-2, NAAT: NOT DETECTED
SODIUM BLD-SCNC: 133 MEQ/L (ref 135–144)
SPECIFIC GRAVITY UA: 1.01 (ref 1–1.03)
TOTAL PROTEIN: 6.6 G/DL (ref 6.3–8)
URINE REFLEX TO CULTURE: ABNORMAL
UROBILINOGEN, URINE: 0.2 E.U./DL
WBC # BLD: 11.6 K/UL (ref 4.8–10.8)
WBC UA: ABNORMAL /HPF (ref 0–5)

## 2022-11-10 PROCEDURE — 86900 BLOOD TYPING SEROLOGIC ABO: CPT

## 2022-11-10 PROCEDURE — 86850 RBC ANTIBODY SCREEN: CPT

## 2022-11-10 PROCEDURE — 59409 OBSTETRICAL CARE: CPT | Performed by: OBSTETRICS & GYNECOLOGY

## 2022-11-10 PROCEDURE — 86592 SYPHILIS TEST NON-TREP QUAL: CPT

## 2022-11-10 PROCEDURE — 2580000003 HC RX 258: Performed by: OBSTETRICS & GYNECOLOGY

## 2022-11-10 PROCEDURE — 84112 EVAL AMNIOTIC FLUID PROTEIN: CPT

## 2022-11-10 PROCEDURE — 6360000002 HC RX W HCPCS: Performed by: OBSTETRICS & GYNECOLOGY

## 2022-11-10 PROCEDURE — 6370000000 HC RX 637 (ALT 250 FOR IP): Performed by: OBSTETRICS & GYNECOLOGY

## 2022-11-10 PROCEDURE — 2500000003 HC RX 250 WO HCPCS: Performed by: OBSTETRICS & GYNECOLOGY

## 2022-11-10 PROCEDURE — 85025 COMPLETE CBC W/AUTO DIFF WBC: CPT

## 2022-11-10 PROCEDURE — 59025 FETAL NON-STRESS TEST: CPT | Performed by: OBSTETRICS & GYNECOLOGY

## 2022-11-10 PROCEDURE — 86901 BLOOD TYPING SEROLOGIC RH(D): CPT

## 2022-11-10 PROCEDURE — 1220000000 HC SEMI PRIVATE OB R&B

## 2022-11-10 PROCEDURE — 80053 COMPREHEN METABOLIC PANEL: CPT

## 2022-11-10 PROCEDURE — 3700000025 EPIDURAL BLOCK: Performed by: NURSE ANESTHETIST, CERTIFIED REGISTERED

## 2022-11-10 PROCEDURE — 87635 SARS-COV-2 COVID-19 AMP PRB: CPT

## 2022-11-10 PROCEDURE — 80307 DRUG TEST PRSMV CHEM ANLYZR: CPT

## 2022-11-10 PROCEDURE — 86762 RUBELLA ANTIBODY: CPT

## 2022-11-10 PROCEDURE — 81001 URINALYSIS AUTO W/SCOPE: CPT

## 2022-11-10 RX ORDER — SODIUM CHLORIDE 0.9 % (FLUSH) 0.9 %
5-40 SYRINGE (ML) INJECTION EVERY 12 HOURS SCHEDULED
Status: DISCONTINUED | OUTPATIENT
Start: 2022-11-10 | End: 2022-11-12 | Stop reason: HOSPADM

## 2022-11-10 RX ORDER — MODIFIED LANOLIN
OINTMENT (GRAM) TOPICAL PRN
Status: DISCONTINUED | OUTPATIENT
Start: 2022-11-10 | End: 2022-11-12 | Stop reason: HOSPADM

## 2022-11-10 RX ORDER — ONDANSETRON 2 MG/ML
4 INJECTION INTRAMUSCULAR; INTRAVENOUS EVERY 6 HOURS PRN
Status: DISCONTINUED | OUTPATIENT
Start: 2022-11-10 | End: 2022-11-12 | Stop reason: HOSPADM

## 2022-11-10 RX ORDER — SODIUM CHLORIDE 0.9 % (FLUSH) 0.9 %
5-40 SYRINGE (ML) INJECTION PRN
Status: DISCONTINUED | OUTPATIENT
Start: 2022-11-10 | End: 2022-11-12 | Stop reason: HOSPADM

## 2022-11-10 RX ORDER — NALOXONE HYDROCHLORIDE 0.4 MG/ML
INJECTION, SOLUTION INTRAMUSCULAR; INTRAVENOUS; SUBCUTANEOUS PRN
Status: DISCONTINUED | OUTPATIENT
Start: 2022-11-10 | End: 2022-11-10

## 2022-11-10 RX ORDER — SODIUM CHLORIDE 9 MG/ML
INJECTION, SOLUTION INTRAVENOUS PRN
Status: DISCONTINUED | OUTPATIENT
Start: 2022-11-10 | End: 2022-11-12 | Stop reason: HOSPADM

## 2022-11-10 RX ORDER — METHYLERGONOVINE MALEATE 0.2 MG/ML
200 INJECTION INTRAVENOUS PRN
Status: DISCONTINUED | OUTPATIENT
Start: 2022-11-10 | End: 2022-11-10

## 2022-11-10 RX ORDER — MISOPROSTOL 200 UG/1
800 TABLET ORAL PRN
Status: DISCONTINUED | OUTPATIENT
Start: 2022-11-10 | End: 2022-11-10

## 2022-11-10 RX ORDER — DOCUSATE SODIUM 100 MG/1
100 CAPSULE, LIQUID FILLED ORAL 2 TIMES DAILY PRN
Status: DISCONTINUED | OUTPATIENT
Start: 2022-11-10 | End: 2022-11-10

## 2022-11-10 RX ORDER — SODIUM CHLORIDE, SODIUM LACTATE, POTASSIUM CHLORIDE, AND CALCIUM CHLORIDE .6; .31; .03; .02 G/100ML; G/100ML; G/100ML; G/100ML
500 INJECTION, SOLUTION INTRAVENOUS PRN
Status: DISCONTINUED | OUTPATIENT
Start: 2022-11-10 | End: 2022-11-10

## 2022-11-10 RX ORDER — ONDANSETRON 2 MG/ML
4 INJECTION INTRAMUSCULAR; INTRAVENOUS EVERY 6 HOURS PRN
Status: DISCONTINUED | OUTPATIENT
Start: 2022-11-10 | End: 2022-11-10

## 2022-11-10 RX ORDER — ACETAMINOPHEN 325 MG/1
650 TABLET ORAL EVERY 4 HOURS PRN
Status: DISCONTINUED | OUTPATIENT
Start: 2022-11-10 | End: 2022-11-10

## 2022-11-10 RX ORDER — IBUPROFEN 600 MG/1
600 TABLET ORAL EVERY 6 HOURS PRN
Qty: 120 TABLET | Refills: 3 | Status: SHIPPED | OUTPATIENT
Start: 2022-11-10

## 2022-11-10 RX ORDER — ACETAMINOPHEN 500 MG
1000 TABLET ORAL EVERY 8 HOURS PRN
Status: DISCONTINUED | OUTPATIENT
Start: 2022-11-10 | End: 2022-11-12 | Stop reason: HOSPADM

## 2022-11-10 RX ORDER — CARBOPROST TROMETHAMINE 250 UG/ML
250 INJECTION, SOLUTION INTRAMUSCULAR PRN
Status: DISCONTINUED | OUTPATIENT
Start: 2022-11-10 | End: 2022-11-10

## 2022-11-10 RX ORDER — DIPHENHYDRAMINE HCL 25 MG
25 TABLET ORAL EVERY 4 HOURS PRN
Status: DISCONTINUED | OUTPATIENT
Start: 2022-11-10 | End: 2022-11-10

## 2022-11-10 RX ORDER — SODIUM CHLORIDE, SODIUM LACTATE, POTASSIUM CHLORIDE, CALCIUM CHLORIDE 600; 310; 30; 20 MG/100ML; MG/100ML; MG/100ML; MG/100ML
INJECTION, SOLUTION INTRAVENOUS CONTINUOUS
Status: DISCONTINUED | OUTPATIENT
Start: 2022-11-10 | End: 2022-11-10

## 2022-11-10 RX ORDER — NALBUPHINE HYDROCHLORIDE 20 MG/ML
5 INJECTION, SOLUTION INTRAMUSCULAR; INTRAVENOUS; SUBCUTANEOUS
Status: DISCONTINUED | OUTPATIENT
Start: 2022-11-10 | End: 2022-11-10

## 2022-11-10 RX ORDER — IBUPROFEN 600 MG/1
600 TABLET ORAL EVERY 6 HOURS PRN
Status: DISCONTINUED | OUTPATIENT
Start: 2022-11-10 | End: 2022-11-12 | Stop reason: HOSPADM

## 2022-11-10 RX ORDER — NALBUPHINE HYDROCHLORIDE 20 MG/ML
10 INJECTION, SOLUTION INTRAMUSCULAR; INTRAVENOUS; SUBCUTANEOUS EVERY 4 HOURS PRN
Status: DISCONTINUED | OUTPATIENT
Start: 2022-11-10 | End: 2022-11-10

## 2022-11-10 RX ORDER — SODIUM CHLORIDE 0.9 % (FLUSH) 0.9 %
5-40 SYRINGE (ML) INJECTION PRN
Status: DISCONTINUED | OUTPATIENT
Start: 2022-11-10 | End: 2022-11-10

## 2022-11-10 RX ORDER — BISACODYL 10 MG
10 SUPPOSITORY, RECTAL RECTAL DAILY PRN
Status: DISCONTINUED | OUTPATIENT
Start: 2022-11-10 | End: 2022-11-12 | Stop reason: HOSPADM

## 2022-11-10 RX ORDER — PENICILLIN G 3000000 [IU]/50ML
3 INJECTION, SOLUTION INTRAVENOUS EVERY 4 HOURS
Status: DISCONTINUED | OUTPATIENT
Start: 2022-11-10 | End: 2022-11-10

## 2022-11-10 RX ORDER — OXYCODONE HYDROCHLORIDE 5 MG/1
5 TABLET ORAL EVERY 4 HOURS PRN
Status: DISCONTINUED | OUTPATIENT
Start: 2022-11-10 | End: 2022-11-12 | Stop reason: HOSPADM

## 2022-11-10 RX ORDER — SODIUM CHLORIDE, SODIUM LACTATE, POTASSIUM CHLORIDE, AND CALCIUM CHLORIDE .6; .31; .03; .02 G/100ML; G/100ML; G/100ML; G/100ML
1000 INJECTION, SOLUTION INTRAVENOUS PRN
Status: DISCONTINUED | OUTPATIENT
Start: 2022-11-10 | End: 2022-11-10

## 2022-11-10 RX ORDER — DOCUSATE SODIUM 100 MG/1
100 CAPSULE, LIQUID FILLED ORAL DAILY
Status: DISCONTINUED | OUTPATIENT
Start: 2022-11-10 | End: 2022-11-12 | Stop reason: HOSPADM

## 2022-11-10 RX ORDER — SODIUM CHLORIDE 0.9 % (FLUSH) 0.9 %
5-40 SYRINGE (ML) INJECTION EVERY 12 HOURS SCHEDULED
Status: DISCONTINUED | OUTPATIENT
Start: 2022-11-10 | End: 2022-11-10

## 2022-11-10 RX ORDER — SODIUM CHLORIDE 9 MG/ML
25 INJECTION, SOLUTION INTRAVENOUS PRN
Status: DISCONTINUED | OUTPATIENT
Start: 2022-11-10 | End: 2022-11-10

## 2022-11-10 RX ADMIN — Medication 3 ML: at 06:16

## 2022-11-10 RX ADMIN — NALBUPHINE HYDROCHLORIDE 10 MG: 20 INJECTION, SOLUTION INTRAMUSCULAR; INTRAVENOUS; SUBCUTANEOUS at 04:54

## 2022-11-10 RX ADMIN — SODIUM CHLORIDE, POTASSIUM CHLORIDE, SODIUM LACTATE AND CALCIUM CHLORIDE 1000 ML: 600; 310; 30; 20 INJECTION, SOLUTION INTRAVENOUS at 05:27

## 2022-11-10 RX ADMIN — SODIUM CHLORIDE, POTASSIUM CHLORIDE, SODIUM LACTATE AND CALCIUM CHLORIDE: 600; 310; 30; 20 INJECTION, SOLUTION INTRAVENOUS at 02:23

## 2022-11-10 RX ADMIN — IBUPROFEN 600 MG: 600 TABLET ORAL at 13:57

## 2022-11-10 RX ADMIN — SODIUM CHLORIDE, POTASSIUM CHLORIDE, SODIUM LACTATE AND CALCIUM CHLORIDE: 600; 310; 30; 20 INJECTION, SOLUTION INTRAVENOUS at 09:20

## 2022-11-10 RX ADMIN — Medication 25 MCG: at 02:56

## 2022-11-10 RX ADMIN — DEXTROSE MONOHYDRATE 5 MILLION UNITS: 5 INJECTION INTRAVENOUS at 02:27

## 2022-11-10 RX ADMIN — ACETAMINOPHEN 1000 MG: 500 TABLET ORAL at 20:59

## 2022-11-10 RX ADMIN — Medication 87.3 MILLI-UNITS/MIN: at 11:48

## 2022-11-10 RX ADMIN — PENICILLIN G 3 MILLION UNITS: 3000000 INJECTION, SOLUTION INTRAVENOUS at 06:26

## 2022-11-10 RX ADMIN — Medication 12 ML/HR: at 06:20

## 2022-11-10 RX ADMIN — PENICILLIN G 3 MILLION UNITS: 3000000 INJECTION, SOLUTION INTRAVENOUS at 10:33

## 2022-11-10 RX ADMIN — Medication 166.7 ML: at 11:49

## 2022-11-10 RX ADMIN — BENZOCAINE AND LEVOMENTHOL: 200; 5 SPRAY TOPICAL at 13:56

## 2022-11-10 RX ADMIN — ONDANSETRON 4 MG: 2 INJECTION INTRAMUSCULAR; INTRAVENOUS at 04:00

## 2022-11-10 RX ADMIN — Medication 1 MILLI-UNITS/MIN: at 07:15

## 2022-11-10 RX ADMIN — Medication: at 13:57

## 2022-11-10 ASSESSMENT — PAIN DESCRIPTION - DESCRIPTORS
DESCRIPTORS: CRAMPING
DESCRIPTORS: CRAMPING

## 2022-11-10 ASSESSMENT — PAIN SCALES - GENERAL
PAINLEVEL_OUTOF10: 4
PAINLEVEL_OUTOF10: 10
PAINLEVEL_OUTOF10: 4

## 2022-11-10 ASSESSMENT — PAIN DESCRIPTION - LOCATION
LOCATION: ABDOMEN
LOCATION: ABDOMEN

## 2022-11-10 NOTE — ANESTHESIA PROCEDURE NOTES
Epidural Block    Patient location during procedure: OB  Start time: 11/10/2022 6:10 AM  End time: 11/10/2022 6:15 AM  Reason for block: labor epidural  Staffing  Performed: resident/CRNA   Resident/CRNA: Korina November, APRN - CRNA  Epidural  Patient position: sitting  Prep: ChloraPrep and site prepped and draped  Patient monitoring: continuous pulse ox and frequent blood pressure checks  Approach: midline  Location: L4-5  Injection technique: KARUNA saline  Provider prep: mask  Needle  Needle type: Tuohy   Needle gauge: 17 G  Needle length: 3.5 in  Needle insertion depth: 5 cm  Catheter type: side hole  Catheter size: 19 G  Catheter at skin depth: 13 cm  Test dose: negative (@0615, 4 ML 1.5%LIDO W 1:200K EPI)Catheter Secured: tegaderm and tape  Assessment  Sensory level: T6  Hemodynamics: stable  Attempts: 1  Outcomes: uncomplicated and patient tolerated procedure well  Additional Notes  Negative heme, negative CSF to catheter aspiration  Preanesthetic Checklist  Completed: patient identified, IV checked, risks and benefits discussed, surgical/procedural consents, equipment checked, pre-op evaluation, timeout performed, anesthesia consent given, oxygen available, monitors applied/VS acknowledged, fire risk safety assessment completed and verbalized and blood product R/B/A discussed and consented

## 2022-11-10 NOTE — OP NOTE
PROCEDURE NOTE: VAGINAL DELIVERY  25 y.o.  at 37w0d GA who presented to L&D for SROM noting clear fluid and early labor. Pt underwent augmentation of labor with pitocin. Pt with epidural for pain management. Pt then with rectal pressure and the desire to push. PT was instructed on pushing efforts and the infant's head was delivered atraumatically followed quickly by the rest of the body. The infant with spontaneous cry was then laid on the mother's abdomen and the cord remained intact for 1 minute for delayed cord clamping. The cord was then clamped and cut and the infant was placed on mother's chest for skin to skin care. The cord blood was then drawn for labs and the placenta delivered spontaneously. The vagina and cervix were inspected and no lacerations were noted. The infant, a viable female infant was born at 11:44 with Apgars 8 and 9 and wt pending  Mother was noted to have excellent uterine tone. Sponge and instrument counts were correct x 2 and mother and baby were recovered in room without difficulty.      EBL: 200cc - please see nursing notes/charting for further EBL    Deann Morales MD

## 2022-11-10 NOTE — FLOWSHEET NOTE
Call to Dr. Yaima Ron. Informed of pts arrival and complaint. Informed of pts positive amnisure results. Updated on pts SVE and EFM strip with contraction pattern. Orders received to admit pt for labor. Stated to give oral cytotec until pt is 3cm and then begin pitocin. Orders received.

## 2022-11-10 NOTE — LACTATION NOTE
This note was copied from a baby's chart.  -initial lactation consult  -baby at left breast in cradle hold upon entering room  -on/off latch noted  -lots of colostrum easily hand expressible  -short nipple noted  -mom reports + breast changes and leaking of colostrum during pregnancy  -baby somewhat mucousy and having difficulty sustaining latch  -recommend  nipple shield  -instructed in application and use  -baby latched to right breast in cradle hold with shield  -deep latch to base of shield, rhythmic sucking in short bursts noted with encouragement  -provided with breastfeeding info guide, reviewed normal infant feeding patterns, weight monitoring and anticipated output  -mom denies questions/concerns at this time  -offered support and encouragement  -LC team will continue to follow    1450-follow up  -mom requesting assistance with feed  -re-educated re: application of nipple shield  -helped mom position baby belly to belly and showed her how to watch for wide gape  -baby latched deeply to base of shield in cross cradle hold  -rhythmic sucking noted and a few swallows audible  -mom reported visible colostrum present in shield after last feed  -offered support and encouragement  -continue frequent skin to skin and breastfeeding per hunger cues  -mom verbalized understanding of teaching

## 2022-11-10 NOTE — H&P
Department of Obstetrics and Gynecology   History & Physical    Pt Name: Dalton iFsh  MRN: [id number] Acct #: [de-identified]  YOB: 2000  Estimated Date of Delivery: 11/10/22      HPI: The patient is a 25 y.o.  37w0d female who presents to [facility] triage for LOF. Pt with confirmed SRoM with clear fluid. +FM, -VB, +LOF, -CTX    Allergies:     Allergies as of 11/10/2022    (No Known Allergies)       Medications:    Current Facility-Administered Medications:     miSOPROStol (CYTOTEC) pre-split tablet TABS 25 mcg, 25 mcg, Oral, every 2 hours, Joselyn Soni MD, 25 mcg at 11/10/22 0256    oxytocin (PITOCIN) 30 units in 500 mL infusion, 1 chen-units/min, IntraVENous, Continuous, Joselyn Soni MD, Last Rate: 1 mL/hr at 11/10/22 0715, 1 chen-units/min at 11/10/22 0715    lactated ringers infusion, , IntraVENous, Continuous, Joselyn Soni MD, Last Rate: 125 mL/hr at 11/10/22 0223, New Bag at 11/10/22 0223    lactated ringers bolus, 500 mL, IntraVENous, PRN, Last Rate: 999 mL/hr at 11/10/22 0315, Rate Change at 11/10/22 0315 **OR** lactated ringers bolus, 1,000 mL, IntraVENous, PRN, Joselyn Soni MD, Last Rate: 500 mL/hr at 11/10/22 0527, 1,000 mL at 11/10/22 0527    sodium chloride flush 0.9 % injection 5-40 mL, 5-40 mL, IntraVENous, 2 times per day, Joselyn Soni MD    sodium chloride flush 0.9 % injection 5-40 mL, 5-40 mL, IntraVENous, PRN, Joselyn Soni MD    0.9 % sodium chloride infusion, 25 mL, IntraVENous, PRN, Joselyn Soni MD    ondansetron (ZOFRAN) injection 4 mg, 4 mg, IntraVENous, Q6H PRN, Joselyn Soni MD, 4 mg at 11/10/22 0400    diphenhydrAMINE (BENADRYL) tablet 25 mg, 25 mg, Oral, Q4H PRN, Joselyn Soni MD    oxytocin (PITOCIN) 30 units in 500 mL infusion, 87.3 chen-units/min, IntraVENous, Continuous PRN **AND** oxytocin (PITOCIN) 10 unit bolus from the bag, 10 Units, IntraVENous, PRN, Joselyn Soni MD    methylergonovine (METHERGINE) injection 200 mcg, 200 mcg, IntraMUSCular, PRN, Charo Weldon MD    carboprost (HEMABATE) injection 250 mcg, 250 mcg, IntraMUSCular, PRN, Charo Weldon MD    miSOPROStol (CYTOTEC) tablet 800 mcg, 800 mcg, Rectal, PRN, Charo Weldon MD    tranexamic acid (CYKLOKAPRON) 1,000 mg in sodium chloride 0.9 % 110 mL IVPB (mini-bag), 1,000 mg, IntraVENous, Once PRN, Charo Weldon MD    acetaminophen (TYLENOL) tablet 650 mg, 650 mg, Oral, Q4H PRN, Lou Torres MD    benzocaine-menthol (DERMOPLAST) 20-0.5 % spray, , Topical, PRN, Charo Weldon MD    docusate sodium (COLACE) capsule 100 mg, 100 mg, Oral, BID PRN, Charo Weldon MD    [COMPLETED] penicillin G potassium 5 Million Units in dextrose 5 % 100 mL IVPB (mini-bag), 5 Million Units, IntraVENous, Once, Stopped at 11/10/22 0257 **FOLLOWED BY** penicillin G potassium IVPB 3 Million Units, 3 Million Units, IntraVENous, Q4H, Charo Weldon MD, Last Rate: 100 mL/hr at 11/10/22 0626, 3 Million Units at 11/10/22 0626    nalbuphine (NUBAIN) injection 5 mg, 5 mg, IntraMUSCular, Once PRN **AND** nalbuphine (NUBAIN) injection 10 mg, 10 mg, IntraVENous, Q4H PRN, Charo Weldon MD, 10 mg at 11/10/22 0454    naloxone 0.4 mg in 10 mL sodium chloride syringe, , IntraVENous, PRN, Charo Weldon MD    ondansetron (ZOFRAN) injection 4 mg, 4 mg, IntraVENous, Q6H PRN, Charo Weldon MD    fentaNYL 125 mcg, ropivacaine 0.2% in sodium chloride 0.9% 127.5ml (OB) epidural, 12 mL/hr, Epidural, Continuous, Charo Weldon MD, Last Rate: 12 mL/hr at 11/10/22 0620, 12 mL/hr at 11/10/22 0620    OB History:     Gyn History: Denies h/o abnormal pap smear, h/o STDs. Past Medical History:   Past Medical History:   Diagnosis Date    Anxiety     Depression        Past Surgical History: History reviewed. No pertinent surgical history.     Social History:   Social History     Tobacco Use   Smoking Status Never   Smokeless Tobacco Never        Family History: Noncontributory; Denies h/o cancer. ROS:  Negative except as stated in HPI, denies nausea, vomiting, fever, chills, headache or dysuria.      PE:  Vitals:    11/10/22 0708   BP: 136/77   Pulse: (!) 109   Resp:    Temp:    SpO2:        General: well nourished, well developed, in no acute distress  CV: Normal heart sounds  Resp: breathing unlabored  Abdomen: Nontender, no rebound, no guarding  FH: 38  Cx: 1/50/-2    NST - Cat 1: FHR 140s, moderate variability, +accels, -decels    Labs:   Blood Type/Rh: O POS    Group B Strep:  negative  Amniosure positive    Assessment:   25 y.o.  37w0d female with term pregnancy and SROM    Plan:   Admit for delivery     Aubrie Garcia MD

## 2022-11-10 NOTE — FLOWSHEET NOTE
Nichelle Chang at bedside.    0600 sitting at side of bed  0605 clean   0609 numb  0611 catheter   0612 test dose  0618 laying down   0620 bolus  MINE Villanueva

## 2022-11-10 NOTE — ANESTHESIA PRE PROCEDURE
Department of Anesthesiology  Preprocedure Note       Name:  John Barksdale   Age:  25 y.o.  :  2000                                          MRN:  [id number]         Date:  11/10/2022      Surgeon: * No surgeons listed *    Procedure: * No procedures listed *    Medications prior to admission:   Prior to Admission medications    Medication Sig Start Date End Date Taking?  Authorizing Provider   Prenatal Vit-Fe Fumarate-FA (PRENATAL VITAMINS) 28-0.8 MG TABS Take 1 tablet by mouth daily 22   Marianne Bloom MD       Current medications:    Current Facility-Administered Medications   Medication Dose Route Frequency Provider Last Rate Last Admin    miSOPROStol (CYTOTEC) pre-split tablet TABS 25 mcg  25 mcg Oral every 2 hours Marianne Bloom MD   25 mcg at 11/10/22 0256    oxytocin (PITOCIN) 30 units in 500 mL infusion  1 chen-units/min IntraVENous Continuous Marianne Bloom MD        lactated ringers infusion   IntraVENous Continuous Marianne Bloom  mL/hr at 11/10/22 0223 New Bag at 11/10/22 0223    lactated ringers bolus  500 mL IntraVENous PRN Marianne Bloom  mL/hr at 11/10/22 0315 Rate Change at 11/10/22 0315    Or    lactated ringers bolus  1,000 mL IntraVENous PRN Marianne Bloom  mL/hr at 11/10/22 0527 1,000 mL at 11/10/22 0527    sodium chloride flush 0.9 % injection 5-40 mL  5-40 mL IntraVENous 2 times per day Marianne Bloom MD        sodium chloride flush 0.9 % injection 5-40 mL  5-40 mL IntraVENous PRN Marianne Bloom MD        0.9 % sodium chloride infusion  25 mL IntraVENous PRN Marianne Bloom MD        ondansetron [facility]) injection 4 mg  4 mg IntraVENous Q6H PRN Marianne Bloom MD   4 mg at 11/10/22 0400    diphenhydrAMINE (BENADRYL) tablet 25 mg  25 mg Oral Q4H PRN Marianne Bloom MD        methylergonovine (METHERGINE) injection 200 mcg  200 mcg IntraMUSCular PRN Marianne Bloom MD        carboprost (HEMABATE) injection 250 mcg  250 mcg IntraMUSCular PRN Lara Mondragon MD        miSOPROStol (CYTOTEC) tablet 800 mcg  800 mcg Rectal PRN Lara Mondragon MD        tranexamic acid (CYKLOKAPRON) 1,000 mg in sodium chloride 0.9 % 110 mL IVPB (mini-bag)  1,000 mg IntraVENous Once PRN Lara Mondragon MD        acetaminophen (TYLENOL) tablet 650 mg  650 mg Oral Q4H PRN Lara Mondragon MD        benzocaine-menthol (DERMOPLAST) 20-0.5 % spray   Topical PRN Lara Mondragon MD        docusate sodium (COLACE) capsule 100 mg  100 mg Oral BID PRN Lara Mondragon MD        penicillin G potassium IVPB 3 Million Units  3 Million Units IntraVENous Q4H Lara Mondragon MD        nalbuphine (NUBAIN) injection 10 mg  10 mg IntraVENous Q4H PRN Lara Mondragon MD   10 mg at 11/10/22 0454    And    nalbuphine (NUBAIN) injection 5 mg  5 mg IntraMUSCular Once PRN Lara Mondragon MD        naloxone 0.4 mg in 10 mL sodium chloride syringe   IntraVENous PRN Lara Mondragon MD        ondansetron (ZOFRAN) injection 4 mg  4 mg IntraVENous Q6H PRN Lara Mondragon MD        fentaNYL 125 mcg, ropivacaine 0.2% in sodium chloride 0.9% 127.5ml (OB) epidural  12 mL/hr Epidural Continuous Lara Mondragon MD           Allergies:  No Known Allergies    Problem List:    Patient Active Problem List   Diagnosis Code    Suicide attempt ([facility] [address] T14.91XA    Fluoxetine hydrochloride poisoning T43.221A    Major depressive disorder without psychotic features F32.9    Cervicalgia M54.2    TMJ (temporomandibular joint syndrome) M26.609    Normal labor and delivery O80       Past Medical History:        Diagnosis Date    Anxiety     Depression        Past Surgical History:  History reviewed. No pertinent surgical history. Social History:    Social History     Tobacco Use    Smoking status: Never    Smokeless tobacco: Never   Substance Use Topics    Alcohol use:  No                                Counseling given: Not Answered      Vital Signs (Current): Vitals:    11/10/22 0202 11/10/22 0203 11/10/22 0552   BP: 124/86 123/76 (!) 143/87   Pulse: 75 87 86   Resp:   18   Temp:   36.6 °C (97.9 °F)                                              BP Readings from Last 3 Encounters:   11/10/22 (!) 143/87   11/07/22 118/76   10/31/22 (!) 106/58       NPO Status:                                                                                 BMI:   Wt Readings from Last 3 Encounters:   11/07/22 124 lb (56.2 kg)   10/31/22 121 lb (54.9 kg)   10/24/22 122 lb (55.3 kg)     There is no height or weight on file to calculate BMI.    CBC:   Lab Results   Component Value Date/Time    WBC 11.6 11/10/2022 02:00 AM    RBC 4.43 11/10/2022 02:00 AM    HGB 11.5 11/10/2022 02:00 AM    HCT 35.0 11/10/2022 02:00 AM    MCV 78.8 11/10/2022 02:00 AM    RDW 13.1 11/10/2022 02:00 AM     11/10/2022 02:00 AM       CMP:   Lab Results   Component Value Date/Time     11/10/2022 02:00 AM    K 4.0 11/10/2022 02:00 AM    K 3.9 01/06/2021 04:50 AM     11/10/2022 02:00 AM    CO2 19 11/10/2022 02:00 AM    BUN 4 11/10/2022 02:00 AM    CREATININE 0.76 11/10/2022 02:00 AM    GFRAA >60.0 01/06/2021 04:50 AM    LABGLOM >60.0 11/10/2022 02:00 AM    GLUCOSE 84 11/10/2022 02:00 AM    PROT 6.6 11/10/2022 02:00 AM    CALCIUM 9.0 11/10/2022 02:00 AM    BILITOT 0.3 11/10/2022 02:00 AM    ALKPHOS 272 11/10/2022 02:00 AM    AST 25 11/10/2022 02:00 AM    ALT 8 11/10/2022 02:00 AM       POC Tests: No results for input(s): POCGLU, POCNA, POCK, POCCL, POCBUN, POCHEMO, POCHCT in the last 72 hours.     Coags:   Lab Results   Component Value Date/Time    PROTIME 13.8 01/05/2021 11:00 PM    INR 1.1 01/05/2021 11:00 PM    APTT 26.3 01/05/2021 11:00 PM       HCG (If Applicable):   Lab Results   Component Value Date    PREGTESTUR Negative 06/01/2018        ABGs: No results found for: PHART, PO2ART, LIL5KBX, XMB3BZW, BEART, X0BHEYUB     Type & Screen (If Applicable):  No results found for: LABABO, [address]    Drug/Infectious Status (If Applicable):  Lab Results   Component Value Date/Time    HEPCAB NONREACTIVE 05/02/2022 10:27 AM       COVID-19 Screening (If Applicable):   Lab Results   Component Value Date/Time    COVID19 Not Detected 11/10/2022 02:05 AM    COVID19 Not Detected 11/08/2021 08:39 PM    COVID19 Not Detected 09/01/2021 03:17 PM           Anesthesia Evaluation  Patient summary reviewed and Nursing notes reviewed  Airway: Mallampati: II  TM distance: >3 FB   Neck ROM: full  Mouth opening: > = 3 FB   Dental: normal exam         Pulmonary:                              Cardiovascular:                      Neuro/Psych:               GI/Hepatic/Renal:             Endo/Other:                     Abdominal:             Vascular: Other Findings:           Anesthesia Plan      epidural     ASA 2             Anesthetic plan and risks discussed with patient. Use of blood products discussed with patient whom consented to blood products.                      AUDI Figueroa - MINE   11/10/2022

## 2022-11-10 NOTE — FLOWSHEET NOTE
Dr. Patito Anand notified of two mild range bp. No new orders at this time.  RN to monitor and notify if any any other abnormal blood pressures

## 2022-11-10 NOTE — FLOWSHEET NOTE
Pt arrived to triage reporting of SROM around 0030 this evening. Pt stated clear continuous fluid since. Pt denies any vaginal bleeding or recent intercourse. Urine sample collected and sent.

## 2022-11-10 NOTE — ANESTHESIA POSTPROCEDURE EVALUATION
Department of Anesthesiology  Postprocedure Note    Patient: Leonidas Swenson  MRN: [id number]  YOB: 2000  Date of evaluation: 11/10/2022      Procedure Summary     Date: 11/10/22 Room / Location:     Anesthesia Start: 8348 Anesthesia Stop: 9690    Procedure: Labor Analgesia Diagnosis:     Scheduled Providers:  Responsible Provider: AUDI Yuen CRNA    Anesthesia Type: Epidural ASA Status: 2          Anesthesia Type: Epidural    Maximo Phase I: Maximo Score: 10    Maximo Phase II:        Anesthesia Post Evaluation    Patient location during evaluation: bedside  Patient participation: complete - patient participated  Level of consciousness: awake and awake and alert  Pain score: 0  Airway patency: patent  Nausea & Vomiting: no nausea and no vomiting  Complications: no  Cardiovascular status: blood pressure returned to baseline and hemodynamically stable  Respiratory status: acceptable  Hydration status: euvolemic

## 2022-11-11 LAB
HCT VFR BLD CALC: 33.1 % (ref 37–47)
HEMOGLOBIN: 10.8 G/DL (ref 12–16)
MCH RBC QN AUTO: 26 PG (ref 27–31.3)
MCHC RBC AUTO-ENTMCNC: 32.5 % (ref 33–37)
MCV RBC AUTO: 79.8 FL (ref 79.4–94.8)
PDW BLD-RTO: 13.4 % (ref 11.5–14.5)
PLATELET # BLD: 168 K/UL (ref 130–400)
RBC # BLD: 4.15 M/UL (ref 4.2–5.4)
WBC # BLD: 16.7 K/UL (ref 4.8–10.8)

## 2022-11-11 PROCEDURE — 85027 COMPLETE CBC AUTOMATED: CPT

## 2022-11-11 PROCEDURE — 1220000000 HC SEMI PRIVATE OB R&B

## 2022-11-11 PROCEDURE — 6370000000 HC RX 637 (ALT 250 FOR IP): Performed by: OBSTETRICS & GYNECOLOGY

## 2022-11-11 PROCEDURE — 99024 POSTOP FOLLOW-UP VISIT: CPT | Performed by: OBSTETRICS & GYNECOLOGY

## 2022-11-11 RX ADMIN — OXYCODONE 5 MG: 5 TABLET ORAL at 00:26

## 2022-11-11 RX ADMIN — IBUPROFEN 600 MG: 600 TABLET ORAL at 00:26

## 2022-11-11 RX ADMIN — DOCUSATE SODIUM 100 MG: 100 CAPSULE, LIQUID FILLED ORAL at 08:21

## 2022-11-11 RX ADMIN — IBUPROFEN 600 MG: 600 TABLET ORAL at 08:31

## 2022-11-11 RX ADMIN — IBUPROFEN 600 MG: 600 TABLET ORAL at 20:53

## 2022-11-11 ASSESSMENT — PAIN SCALES - GENERAL
PAINLEVEL_OUTOF10: 3
PAINLEVEL_OUTOF10: 9
PAINLEVEL_OUTOF10: 2

## 2022-11-11 ASSESSMENT — PAIN DESCRIPTION - ORIENTATION: ORIENTATION: LOWER

## 2022-11-11 ASSESSMENT — PAIN - FUNCTIONAL ASSESSMENT: PAIN_FUNCTIONAL_ASSESSMENT: ACTIVITIES ARE NOT PREVENTED

## 2022-11-11 ASSESSMENT — PAIN DESCRIPTION - LOCATION
LOCATION: ABDOMEN
LOCATION: ABDOMEN

## 2022-11-11 ASSESSMENT — PAIN DESCRIPTION - DESCRIPTORS: DESCRIPTORS: CRAMPING

## 2022-11-11 NOTE — LACTATION NOTE
This note was copied from a baby's chart.  -mom has been combo feeding, states that was her intent  -pumped once overnight  -counseled on risks of early supplementation to milk supply  -recommend pumping if she is offering bottle  -reviewed breastmilk supply/demand  -educated on paced bottle feeding  -reinforced prior teaching re: benefits of skin to skin, breastfeeding pre hunger cues  -strongly encouraged to call for assistance with next feed    1100-follow up  -questioned re: infant feeding  -per dad, mom attempted to breastfeed around 10am (did not call for [facility] assistance)  -has been mostly formula feeding  -offered support for feeding choice and encouraged to call when baby begins showing feeding cues if she should decide to put baby to breast  -parents both verbalize understanding of teaching

## 2022-11-11 NOTE — PLAN OF CARE
Problem: Pain  Goal: Verbalizes/displays adequate comfort level or baseline comfort level  Outcome: Progressing     Problem: Infection - Adult  Goal: Absence of infection at discharge  Outcome: Progressing  Goal: Absence of infection during hospitalization  Outcome: Progressing  Goal: Absence of fever/infection during anticipated neutropenic period  Outcome: Progressing     Problem: Safety - Adult  Goal: Free from fall injury  Outcome: Progressing     Problem: Postpartum  Goal: Experiences normal postpartum course  Description:  Postpartum OB-Pregnancy care plan goal which identifies if the mother is experiencing a normal postpartum course  Outcome: Progressing  Goal: Appropriate maternal -  bonding  Description:  Postpartum OB-Pregnancy care plan goal which identifies if the mother and  are bonding appropriately  Outcome: Progressing  Goal: Establishment of infant feeding pattern  Description:  Postpartum OB-Pregnancy care plan goal which identifies if the mother is establishing a feeding pattern with their   Outcome: Progressing  Goal: Incisions, wounds, or drain sites healing without S/S of infection  Outcome: Progressing     Problem: Vaginal Birth or  Section  Goal: Fetal and maternal status remain reassuring during the birth process  Description:  Birth OB-Pregnancy care plan goal which identifies if the fetal and maternal status remain reassuring during the birth process  Outcome: Completed

## 2022-11-11 NOTE — PLAN OF CARE
Problem: Pain  Goal: Verbalizes/displays adequate comfort level or baseline comfort level  2022 075 by Nabil Hassan RN  Outcome: Progressing  2022 030 by Debra Cary RN  Outcome: Progressing     Problem: Infection - Adult  Goal: Absence of infection at discharge  2022 075 by Nabil Hassan RN  Outcome: Progressing  2022 030 by Debra Cary RN  Outcome: Progressing  Goal: Absence of infection during hospitalization  2022 075 by Nabil Hassan RN  Outcome: Progressing  2022 030 by Debra Cary RN  Outcome: Progressing  Goal: Absence of fever/infection during anticipated neutropenic period  2022 075 by Nabil Hassan RN  Outcome: Progressing  2022 030 by Debra Cary RN  Outcome: Progressing     Problem: Safety - Adult  Goal: Free from fall injury  2022 075 by Nabil Hassan RN  Outcome: Progressing  2022 030 by Debra Cary RN  Outcome: Progressing     Problem: Postpartum  Goal: Experiences normal postpartum course  Description:  Postpartum OB-Pregnancy care plan goal which identifies if the mother is experiencing a normal postpartum course  2022 075 by Nabil Hassan RN  Outcome: Progressing  2022 by Debra Cary RN  Outcome: Progressing  Goal: Appropriate maternal -  bonding  Description:  Postpartum OB-Pregnancy care plan goal which identifies if the mother and  are bonding appropriately  2022 075 by Nabil Hassan RN  Outcome: Progressing  2022 030 by Debra Cary RN  Outcome: Progressing  Goal: Establishment of infant feeding pattern  Description:  Postpartum OB-Pregnancy care plan goal which identifies if the mother is establishing a feeding pattern with their   2022 075 by Nabil Hassan RN  Outcome: Progressing  2022 by Debra Cary RN  Outcome: Progressing  Goal: Incisions, wounds, or drain sites healing without S/S of infection  11/11/2022 0752 by Danica Boston, RN  Outcome: Progressing  11/11/2022 0305 by Clearance Age, RN  Outcome: Progressing

## 2022-11-11 NOTE — PROGRESS NOTES
PROGRESS NOTE: POSTPARTUM DAY 1    Pt is doing well. Pt is ambulation and tolerating po. Pt bottle feeding without issue. Lochia wnl    /84   Pulse 77   Temp 98.2 °F (36.8 °C) (Oral)   Resp 16   LMP 02/07/2022   SpO2 99%   Breastfeeding Unknown   Hemoglobin   Date Value Ref Range Status   11/11/2022 10.8 (L) 12.0 - 16.0 g/dL Final     CVS: RRR  Lungs: CTAB  ABD: Uterus firm at umbilicus  EXT: no c/c/e    25 y.o. Dayday Garcia now P1 s/p vaginal delivery doing well PPD#1  1. May discharge to home when infant released  2. Rx Ibuprofen to pharmacy   3.  F/u with Dr. Levi Cogan in [place] MD

## 2022-11-11 NOTE — CARE COORDINATION
LSW meet with pt at bedside. Reason for visit: pt tested positive for THC in 4/4/2022. Pt report there is everything at home for the baby. Diapers, clothing, formula, crib and car seat. No transportation issues. Pt report will be staying home taking care of baby. FOB will be working full time. Currently pt and FOB lives at home with pt's mother. Pt report there is family at both side that is able to help out with baby if need it. Pt report connected with [employer]. List of community resources if left at bedside with pt. Pt report not knowing why she tested positive with THC. Per FOB, \" I smoke, and I slow it down when I found out she was pregnant. It is probably from second hand smoking. \"     Per RN, \" some concerns regarding care of baby. Reminding pt and FOB to feed baby. But FOB is setting alarms on his phone to improve. \"     Pt can go home with baby at the time of DC. LSW will follow for any questions.      Electronically signed by PITER Mcdowell on 11/11/2022 at 2:43 PM

## 2022-11-12 VITALS
TEMPERATURE: 97.8 F | SYSTOLIC BLOOD PRESSURE: 125 MMHG | RESPIRATION RATE: 16 BRPM | DIASTOLIC BLOOD PRESSURE: 84 MMHG | HEART RATE: 91 BPM | OXYGEN SATURATION: 100 %

## 2022-11-12 PROCEDURE — 6370000000 HC RX 637 (ALT 250 FOR IP): Performed by: OBSTETRICS & GYNECOLOGY

## 2022-11-12 PROCEDURE — 7200000001 HC VAGINAL DELIVERY

## 2022-11-12 PROCEDURE — 99024 POSTOP FOLLOW-UP VISIT: CPT | Performed by: OBSTETRICS & GYNECOLOGY

## 2022-11-12 RX ADMIN — DOCUSATE SODIUM 100 MG: 100 CAPSULE, LIQUID FILLED ORAL at 09:30

## 2022-11-12 RX ADMIN — ACETAMINOPHEN 1000 MG: 500 TABLET ORAL at 03:01

## 2022-11-12 ASSESSMENT — PAIN DESCRIPTION - LOCATION: LOCATION: ABDOMEN

## 2022-11-12 ASSESSMENT — PAIN SCALES - GENERAL: PAINLEVEL_OUTOF10: 4

## 2022-11-12 ASSESSMENT — PAIN DESCRIPTION - DESCRIPTORS: DESCRIPTORS: CRAMPING

## 2022-11-12 ASSESSMENT — PAIN - FUNCTIONAL ASSESSMENT: PAIN_FUNCTIONAL_ASSESSMENT: ACTIVITIES ARE NOT PREVENTED

## 2022-11-12 ASSESSMENT — PAIN DESCRIPTION - ORIENTATION: ORIENTATION: LOWER

## 2022-11-12 NOTE — PLAN OF CARE
Problem: Pain  Goal: Verbalizes/displays adequate comfort level or baseline comfort level  2022 by Anabel Tena RN  Outcome: Adequate for Discharge  2022 by Kellen Sauceda RN  Outcome: Progressing     Problem: Infection - Adult  Goal: Absence of infection at discharge  2022 by Anabel Tena RN  Outcome: Adequate for Discharge  2022 by Kellen Sauceda RN  Outcome: Progressing  Goal: Absence of infection during hospitalization  2022 by Anabel Tena RN  Outcome: Adequate for Discharge  2022 by Kellen Sauceda RN  Outcome: Progressing  Goal: Absence of fever/infection during anticipated neutropenic period  2022 by Anabel Tena RN  Outcome: Adequate for Discharge  2022 by Kellen Sauceda RN  Outcome: Progressing     Problem: Safety - Adult  Goal: Free from fall injury  2022 by Anabel Tena RN  Outcome: Adequate for Discharge  2022 by Kellen Sauceda RN  Outcome: Progressing     Problem: Postpartum  Goal: Experiences normal postpartum course  Description:  Postpartum OB-Pregnancy care plan goal which identifies if the mother is experiencing a normal postpartum course  2022 by Anabel Tena RN  Outcome: Adequate for Discharge  2022 by Kellen Sauceda RN  Outcome: Progressing  Goal: Appropriate maternal -  bonding  Description:  Postpartum OB-Pregnancy care plan goal which identifies if the mother and  are bonding appropriately  2022 by Anabel Tena RN  Outcome: Adequate for Discharge  2022 by Kellen Sauceda RN  Outcome: Progressing  Goal: Establishment of infant feeding pattern  Description:  Postpartum OB-Pregnancy care plan goal which identifies if the mother is establishing a feeding pattern with their   2022 by Anabel Tena RN  Outcome: Adequate for Discharge  2022 by Kellen Sauceda RN  Outcome: Progressing  Goal: Incisions, wounds, or drain sites healing without S/S of infection  11/12/2022 0930 by Lorri Hobson RN  Outcome: Adequate for Discharge  11/11/2022 2205 by Jesús Manning RN  Outcome: Progressing

## 2022-11-12 NOTE — DISCHARGE SUMMARY
Vaginal Delivery PP Note/Obstetric Discharge Summary      Subjective:       25 y.o.  Deborah Bansal @ 40w0d    Postpartum Day 2: Vaginal Delivery on 11/10 @  for baby girl    The patient feels well. The patient denies emotional concerns. Pain is well controlled with current medications. The baby iswell. Baby is feeding via breast. The patient is ambulating well. The patient is tolerating a normal diet. Objective:      Patient Vitals for the past 8 hrs:   BP Temp Temp src Pulse Resp SpO2   22 0256 122/82 98 °F (36.7 °C) Oral 96 16 100 %     General:    alert, appears stated age, and cooperative   Bowel Sounds:  active   Lochia:  appropriate   Uterine Fundus:   firm   Perineum: Episiotomy no  Lacerationno     DVT Evaluation:  No evidence of DVT seen on physical exam.     No results found for: CBC      Assessment:     Status post vaginal delivery . Doing well postoperatively. Plan:     Discharge home with standard precautions and return to clinic in 4-6 weeks. Routine postpartum instructions reviewed with patient. Family planning concern discussed with patient.      Reasons for Admission on 11/10/2022  1:20 AM  Onset of Labor    Prenatal Procedures  None    Intrapartum Procedures  Delivery Method: N/A    Postpartum Procedures  None     Data  Information for the patient's :  Mercedes Goddard Girl Lyndon Leon [[id number]]   female   Birth Weight: 6 lb 7.4 oz (2.93 kg)   Discharge With Mother  Complications: No    Discharge Diagnosis  Patient Active Problem List    Diagnosis Date Noted    Normal labor and delivery 11/10/2022    Cervicalgia 2015    TMJ (temporomandibular joint syndrome) 2015    Suicide attempt ([address] 2021    Fluoxetine hydrochloride poisoning 2021    Major depressive disorder without psychotic features 2021       Discharge Information  Current Discharge Medication List        START taking these medications    Details   ibuprofen (ADVIL;MOTRIN) 600 MG tablet Take 1 tablet by mouth every 6 hours as needed for Pain  Qty: 120 tablet, Refills: 3           CONTINUE these medications which have NOT CHANGED    Details   Prenatal Vit-Fe Fumarate-FA (PRENATAL VITAMINS) 28-0.8 MG TABS Take 1 tablet by mouth daily  Qty: 30 tablet, Refills: 3    Associated Diagnoses: Amenorrhea; Pregnancy test positive               Discharge to: Home        Discharge Date: 11/12

## 2022-11-12 NOTE — PLAN OF CARE
Problem: Pain  Goal: Verbalizes/displays adequate comfort level or baseline comfort level  Outcome: Progressing     Problem: Infection - Adult  Goal: Absence of infection at discharge  Outcome: Progressing  Goal: Absence of infection during hospitalization  Outcome: Progressing  Goal: Absence of fever/infection during anticipated neutropenic period  Outcome: Progressing     Problem: Safety - Adult  Goal: Free from fall injury  Outcome: Progressing     Problem: Postpartum  Goal: Experiences normal postpartum course  Description:  Postpartum OB-Pregnancy care plan goal which identifies if the mother is experiencing a normal postpartum course  Outcome: Progressing  Goal: Appropriate maternal -  bonding  Description:  Postpartum OB-Pregnancy care plan goal which identifies if the mother and  are bonding appropriately  Outcome: Progressing  Goal: Establishment of infant feeding pattern  Description:  Postpartum OB-Pregnancy care plan goal which identifies if the mother is establishing a feeding pattern with their   Outcome: Progressing  Goal: Incisions, wounds, or drain sites healing without S/S of infection  Outcome: Progressing

## 2022-11-12 NOTE — FLOWSHEET NOTE
Guide for new mothers education, Discharge instructions, and  Post-Warning Signs sheet reviewed with patient. Questions answered. Pt verbalizes understanding, however seemed uninterested throughout teaching.

## 2022-12-19 ENCOUNTER — OFFICE VISIT (OUTPATIENT)
Dept: OBGYN CLINIC | Age: 22
End: 2022-12-19
Payer: COMMERCIAL

## 2022-12-19 VITALS — BODY MASS INDEX: 19.57 KG/M2 | DIASTOLIC BLOOD PRESSURE: 64 MMHG | SYSTOLIC BLOOD PRESSURE: 102 MMHG | WEIGHT: 107 LBS

## 2022-12-19 DIAGNOSIS — Z30.09 BIRTH CONTROL COUNSELING: ICD-10-CM

## 2022-12-19 RX ORDER — PRENATAL WITH FERROUS FUM AND FOLIC ACID 3080; 920; 120; 400; 22; 1.84; 3; 20; 10; 1; 12; 200; 27; 25; 2 [IU]/1; [IU]/1; MG/1; [IU]/1; MG/1; MG/1; MG/1; MG/1; MG/1; MG/1; UG/1; MG/1; MG/1; MG/1; MG/1
TABLET ORAL
COMMUNITY
Start: 2022-11-07

## 2022-12-19 RX ORDER — NORGESTIMATE AND ETHINYL ESTRADIOL 0.25-0.035
KIT ORAL
COMMUNITY
Start: 2022-12-09

## 2022-12-19 NOTE — PROGRESS NOTES
SUBJECTIVE:   25 y.o. Alexandre Greenberg here for post partum exam. Pt s/p TSVD. Pt bottle feeding without difficulty. Pt with irregular VB since delivery and no complaints. Review of Systems:  General ROS: negative  Psychological ROS: negative  ENT ROS: negative  Endocrine ROS: negative  Respiratory ROS: no cough, shortness of breath, or wheezing  Cardiovascular ROS: no chest pain or dyspnea on exertion  Gastrointestinal ROS: no abdominal pain, change in bowel habits, or black or bloody stools  Genito-Urinary ROS: no dysuria, trouble voiding, or hematuria  Musculoskeletal ROS: negative  Neurological ROS: no TIA or stroke symptoms  Dermatological ROS: negative    OBJECTIVE:   /64   Wt 107 lb (48.5 kg)   LMP 02/07/2022   Breastfeeding No Comment: Bottle feeding is going good. BMI 19.57 kg/m²     Physical Exam:  GEN: She appears well, afebrile. HEENT: normal cephalic, atraumatic  CVS: regular rate and rhythm  ABDOMEN: benign, soft, nontender, no masses. MUSCULOSKELETAL: normal gait, no masses  SKIN: normal texture and tone, no lesions  NEURO: normal tone, no hyperreflexia, 1+DTRs throughout    ASSESSMENT:   Post partum care and exam    PLAN:   LPS 5/22 NILM HR HPV positive  Past medical, social and family history reviewed and updated in pt's chart. Post partum care reviewed with patient. Pt adjusting well to infant and denies any depressions sx. Risks, benefits and alternative therapies for metrorrhagia discussed.    Pt on ocs and will continue as directed

## 2023-10-11 ENCOUNTER — HOSPITAL ENCOUNTER (EMERGENCY)
Age: 23
Discharge: HOME | End: 2023-10-11
Payer: COMMERCIAL

## 2023-10-11 ENCOUNTER — APPOINTMENT (OUTPATIENT)
Dept: GENERAL RADIOLOGY | Age: 23
End: 2023-10-11
Payer: COMMERCIAL

## 2023-10-11 VITALS
OXYGEN SATURATION: 100 % | WEIGHT: 130 LBS | HEIGHT: 63 IN | DIASTOLIC BLOOD PRESSURE: 75 MMHG | BODY MASS INDEX: 23.04 KG/M2 | TEMPERATURE: 98.5 F | HEART RATE: 100 BPM | SYSTOLIC BLOOD PRESSURE: 111 MMHG | RESPIRATION RATE: 18 BRPM

## 2023-10-11 DIAGNOSIS — J01.40 ACUTE NON-RECURRENT PANSINUSITIS: Primary | ICD-10-CM

## 2023-10-11 LAB
INFLUENZA A BY PCR: NEGATIVE
INFLUENZA B BY PCR: NEGATIVE
SARS-COV-2 RDRP RESP QL NAA+PROBE: NOT DETECTED

## 2023-10-11 PROCEDURE — 99284 EMERGENCY DEPT VISIT MOD MDM: CPT

## 2023-10-11 PROCEDURE — 6370000000 HC RX 637 (ALT 250 FOR IP)

## 2023-10-11 PROCEDURE — 87502 INFLUENZA DNA AMP PROBE: CPT

## 2023-10-11 PROCEDURE — 71045 X-RAY EXAM CHEST 1 VIEW: CPT

## 2023-10-11 PROCEDURE — 87635 SARS-COV-2 COVID-19 AMP PRB: CPT

## 2023-10-11 RX ORDER — BROMPHENIRAMINE MALEATE, PSEUDOEPHEDRINE HYDROCHLORIDE, AND DEXTROMETHORPHAN HYDROBROMIDE 2; 30; 10 MG/5ML; MG/5ML; MG/5ML
5 SYRUP ORAL 4 TIMES DAILY PRN
Qty: 118 ML | Refills: 0 | Status: SHIPPED | OUTPATIENT
Start: 2023-10-11

## 2023-10-11 RX ORDER — AMOXICILLIN AND CLAVULANATE POTASSIUM 875; 125 MG/1; MG/1
1 TABLET, FILM COATED ORAL ONCE
Status: COMPLETED | OUTPATIENT
Start: 2023-10-11 | End: 2023-10-11

## 2023-10-11 RX ORDER — AMOXICILLIN AND CLAVULANATE POTASSIUM 875; 125 MG/1; MG/1
1 TABLET, FILM COATED ORAL 2 TIMES DAILY
Qty: 20 TABLET | Refills: 0 | Status: SHIPPED | OUTPATIENT
Start: 2023-10-11 | End: 2023-10-21

## 2023-10-11 RX ORDER — FLUTICASONE PROPIONATE 50 MCG
2 SPRAY, SUSPENSION (ML) NASAL DAILY
Qty: 16 G | Refills: 0 | Status: SHIPPED | OUTPATIENT
Start: 2023-10-11

## 2023-10-11 RX ADMIN — AMOXICILLIN AND CLAVULANATE POTASSIUM 1 TABLET: 875; 125 TABLET, FILM COATED ORAL at 22:51

## 2023-10-11 ASSESSMENT — PAIN SCALES - GENERAL: PAINLEVEL_OUTOF10: 2

## 2023-10-11 ASSESSMENT — PAIN DESCRIPTION - LOCATION: LOCATION: CHEST

## 2023-10-11 ASSESSMENT — PAIN DESCRIPTION - FREQUENCY: FREQUENCY: CONTINUOUS

## 2023-10-11 ASSESSMENT — PAIN DESCRIPTION - ONSET: ONSET: ON-GOING

## 2023-10-11 ASSESSMENT — PAIN DESCRIPTION - DESCRIPTORS: DESCRIPTORS: ACHING;DISCOMFORT

## 2023-10-12 ASSESSMENT — ENCOUNTER SYMPTOMS
SINUS PRESSURE: 1
COUGH: 1
SINUS PAIN: 1
RHINORRHEA: 1

## 2023-10-12 NOTE — ED PROVIDER NOTES
[facility] ED  EMERGENCY DEPARTMENT ENCOUNTER      Pt Name: Parag Tom  MRN: [id number]  [address] [facility] [place] [address]  Date of evaluation: 10/11/2023  Provider: JAZLYN Aguirre    CHIEF COMPLAINT       Chief Complaint   Patient presents with    Cough     Congestion, chest pain when coughing         HISTORY OF PRESENT ILLNESS   (Location/Symptom, Timing/Onset, Context/Setting, Quality, Duration, Modifying Factors, Severity)  Note limiting factors. Parag Tom is a 21 y.o. female whom per chart review has a PMHx of depression, anxiety presents to ED for evaluation of generalized illness. Patient reports that she has had intermittently productive cough, congestion, runny nose, sinus pain/pressure. Reports symptoms have been present for the last 3 weeks. Reports multiple ill contacts, exposures. Patient denies taking any OTC medications for relief of symptoms. Patient verbalizes no additional complaints. Patient denies chest pain, shortness of breath, N/V/D, fever, chills. HPI    Nursing Notes were reviewed. REVIEW OF SYSTEMS    (2-9 systems for level 4, 10 or more for level 5)     Review of Systems   HENT:  Positive for congestion, rhinorrhea, sinus pressure and sinus pain. Respiratory:  Positive for cough. All other systems reviewed and are negative. Except as noted above the remainder of the review of systems was reviewed and negative. PAST MEDICAL HISTORY     Past Medical History:   Diagnosis Date    Anxiety     Depression          SURGICAL HISTORY     History reviewed. No pertinent surgical history.       CURRENT MEDICATIONS       Discharge Medication List as of 10/11/2023 10:47 PM        CONTINUE these medications which have NOT CHANGED    Details   norgestimate-ethinyl estradiol (ORTHO-CYCLEN) 0.25-35 MG-MCG per tablet take 1 tablet by mouth once dailyHistorical Med      Prenatal Vit-Fe Fumarate-FA (PRENATAL VITAMIN) 27-1 MG TABS tablet take 1 tablet by mouth once

## 2023-10-12 NOTE — ED NOTES
Pt c/po cough, congestion x's 3 weeks. Pt states chest pain when coughing.      Kye Lozoya  10/11/23 2044

## 2023-10-12 NOTE — DISCHARGE INSTRUCTIONS
Take medications as directed. You may take Motrin, Tylenol in addition. Follow-up with PCP. Return to ED if any new, or worsening symptoms.

## 2023-12-02 ENCOUNTER — APPOINTMENT (OUTPATIENT)
Dept: GENERAL RADIOLOGY | Age: 23
End: 2023-12-02
Payer: COMMERCIAL

## 2023-12-02 ENCOUNTER — HOSPITAL ENCOUNTER (EMERGENCY)
Age: 23
Discharge: HOME | End: 2023-12-03
Payer: COMMERCIAL

## 2023-12-02 VITALS
OXYGEN SATURATION: 95 % | DIASTOLIC BLOOD PRESSURE: 80 MMHG | SYSTOLIC BLOOD PRESSURE: 109 MMHG | HEART RATE: 94 BPM | RESPIRATION RATE: 14 BRPM | TEMPERATURE: 98.1 F

## 2023-12-02 DIAGNOSIS — J20.9 ACUTE BRONCHITIS WITH BRONCHOSPASM: Primary | ICD-10-CM

## 2023-12-02 LAB
HCG, URINE, POC: NEGATIVE
INFLUENZA A BY PCR: NEGATIVE
INFLUENZA B BY PCR: NEGATIVE
Lab: NORMAL
NEGATIVE QC PASS/FAIL: NORMAL
POSITIVE QC PASS/FAIL: NORMAL
RSV BY PCR: NEGATIVE
SARS-COV-2 RDRP RESP QL NAA+PROBE: NOT DETECTED

## 2023-12-02 PROCEDURE — 99284 EMERGENCY DEPT VISIT MOD MDM: CPT

## 2023-12-02 PROCEDURE — 87502 INFLUENZA DNA AMP PROBE: CPT

## 2023-12-02 PROCEDURE — 87635 SARS-COV-2 COVID-19 AMP PRB: CPT

## 2023-12-02 PROCEDURE — 87634 RSV DNA/RNA AMP PROBE: CPT

## 2023-12-02 PROCEDURE — 71045 X-RAY EXAM CHEST 1 VIEW: CPT

## 2023-12-02 PROCEDURE — 6370000000 HC RX 637 (ALT 250 FOR IP): Performed by: PHYSICIAN ASSISTANT

## 2023-12-02 RX ORDER — DEXTROMETHORPHAN HYDROBROMIDE AND PROMETHAZINE HYDROCHLORIDE 15; 6.25 MG/5ML; MG/5ML
5 SYRUP ORAL 4 TIMES DAILY PRN
Qty: 140 ML | Refills: 0 | Status: SHIPPED | OUTPATIENT
Start: 2023-12-02 | End: 2023-12-09

## 2023-12-02 RX ORDER — PREDNISONE 10 MG/1
60 TABLET ORAL DAILY
Qty: 30 TABLET | Refills: 0 | Status: SHIPPED | OUTPATIENT
Start: 2023-12-02 | End: 2023-12-07

## 2023-12-02 RX ORDER — ALBUTEROL SULFATE 90 UG/1
2 AEROSOL, METERED RESPIRATORY (INHALATION) 4 TIMES DAILY PRN
Qty: 18 G | Refills: 0 | Status: SHIPPED | OUTPATIENT
Start: 2023-12-02

## 2023-12-02 RX ORDER — AZITHROMYCIN 500 MG/1
500 TABLET, FILM COATED ORAL ONCE
Status: COMPLETED | OUTPATIENT
Start: 2023-12-02 | End: 2023-12-02

## 2023-12-02 RX ORDER — AZITHROMYCIN 250 MG/1
TABLET, FILM COATED ORAL
Qty: 1 PACKET | Refills: 0 | Status: SHIPPED | OUTPATIENT
Start: 2023-12-02 | End: 2023-12-12

## 2023-12-02 RX ORDER — PREDNISONE 20 MG/1
60 TABLET ORAL ONCE
Status: COMPLETED | OUTPATIENT
Start: 2023-12-02 | End: 2023-12-02

## 2023-12-02 RX ADMIN — AZITHROMYCIN 500 MG: 500 TABLET, FILM COATED ORAL at 23:56

## 2023-12-02 RX ADMIN — PREDNISONE 60 MG: 20 TABLET ORAL at 23:56

## 2023-12-02 ASSESSMENT — ENCOUNTER SYMPTOMS
DIARRHEA: 0
VOMITING: 0
STRIDOR: 0
SORE THROAT: 0
SHORTNESS OF BREATH: 0
NAUSEA: 0
COUGH: 1

## 2023-12-02 ASSESSMENT — LIFESTYLE VARIABLES
HOW MANY STANDARD DRINKS CONTAINING ALCOHOL DO YOU HAVE ON A TYPICAL DAY: PATIENT DOES NOT DRINK
HOW OFTEN DO YOU HAVE A DRINK CONTAINING ALCOHOL: NEVER

## 2023-12-03 NOTE — ED PROVIDER NOTES
[facility] ED  eMERGENCYdEPARTMENT eNCOUnter        Pt Name: Emanuel Maurice  MRN: [id number]  [address] [facility] [address] evaluation: 12/2/2023  Provider:Dany Grier PA-C  10:41 PM EST    CHIEF COMPLAINT       Chief Complaint   Patient presents with    Cough     X 2 weeks     Nasal Congestion         HISTORY OF PRESENT ILLNESS  (Location/Symptom, Timing/Onset, Context/Setting, Quality, Duration, Modifying Factors, Severity.)   Emanuel Maurice is a 21 y.o. female who presents to the emergency department with 2 weeks of persistent cough and congestion. Patient has been vomiting or diarrhea. Patient denies any sore throat difficulty swallowing. Patient has not been take anything for her symptoms. HPI    Nursing Notes were reviewed and I agree. REVIEW OF SYSTEMS    (2-9 systems for level 4, 10 or more for level 5)     Review of Systems   Constitutional:  Positive for fatigue. HENT:  Positive for congestion. Negative for ear pain and sore throat. Respiratory:  Positive for cough. Negative for shortness of breath and stridor. Cardiovascular:  Negative for chest pain. Gastrointestinal:  Negative for diarrhea, nausea and vomiting. Neurological:  Positive for headaches. All other systems reviewed and are negative. as noted above the remainder of the review of systems was reviewed and negative. PAST MEDICAL HISTORY     Past Medical History:   Diagnosis Date    Anxiety     Depression          SURGICAL HISTORY     No past surgical history on file.       CURRENT MEDICATIONS       Previous Medications    BROMPHENIRAMINE-PSEUDOEPHEDRINE-DM 2-30-10 MG/5ML SYRUP    Take 5 mLs by mouth 4 times daily as needed for Cough    FLUTICASONE (FLONASE) 50 MCG/ACT NASAL SPRAY    2 sprays by Each Nostril route daily    IBUPROFEN (ADVIL;MOTRIN) 600 MG TABLET    Take 1 tablet by mouth every 6 hours as needed for Pain    NORGESTIMATE-ETHINYL ESTRADIOL (ORTHO-CYCLEN) 0.25-35 MG-MCG PER TABLET    take 1

## 2024-08-15 ENCOUNTER — HOSPITAL ENCOUNTER (OUTPATIENT)
Facility: HOSPITAL | Age: 24
Setting detail: OUTPATIENT SURGERY
End: 2024-08-15
Attending: DENTIST | Admitting: DENTIST

## 2024-11-13 ENCOUNTER — ANESTHESIA EVENT (OUTPATIENT)
Dept: OPERATING ROOM | Facility: HOSPITAL | Age: 24
End: 2024-11-13
Payer: COMMERCIAL

## 2024-11-14 ENCOUNTER — ANESTHESIA (OUTPATIENT)
Dept: OPERATING ROOM | Facility: HOSPITAL | Age: 24
End: 2024-11-14
Payer: COMMERCIAL

## 2024-11-14 NOTE — ANESTHESIA PREPROCEDURE EVALUATION
Patient: Serene Murphy    Procedure Information       Date/Time: 11/14/24 1400    Procedures:       Teeth Extractions; Maxilla Cyst Excision (Bilateral: Mouth) - 12479- Anesthesia   #7-10 and possible ext of assoc. teeth      Excision Bone Cyst Maxilla (Bilateral)    Location: [facility] / [facility]    Surgeons: Jose Angel Nugent MD, DDS            Relevant Problems   No relevant active problems       Clinical information reviewed:    Allergies                NPO Detail:  No data recorded     PHYSICAL EXAM    Anesthesia Plan

## 2024-11-19 ENCOUNTER — LAB REQUISITION (OUTPATIENT)
Dept: LAB | Facility: HOSPITAL | Age: 24
End: 2024-11-19
Payer: COMMERCIAL

## 2024-11-19 PROCEDURE — 88305 TISSUE EXAM BY PATHOLOGIST: CPT

## 2024-11-22 LAB
LABORATORY COMMENT REPORT: NORMAL
PATH REPORT.FINAL DX SPEC: NORMAL
PATH REPORT.GROSS SPEC: NORMAL
PATH REPORT.RELEVANT HX SPEC: NORMAL
PATH REPORT.TOTAL CANCER: NORMAL

## 2025-04-15 ENCOUNTER — OFFICE VISIT (OUTPATIENT)
Dept: OBGYN CLINIC | Age: 25
End: 2025-04-15
Payer: COMMERCIAL

## 2025-04-15 VITALS
HEIGHT: 63 IN | WEIGHT: 103 LBS | BODY MASS INDEX: 18.25 KG/M2 | SYSTOLIC BLOOD PRESSURE: 116 MMHG | DIASTOLIC BLOOD PRESSURE: 72 MMHG

## 2025-04-15 DIAGNOSIS — Z12.4 ENCOUNTER FOR PAP SMEAR OF CERVIX WITH HPV DNA COTESTING: ICD-10-CM

## 2025-04-15 DIAGNOSIS — Z01.419 WELL WOMAN EXAM WITH ROUTINE GYNECOLOGICAL EXAM: ICD-10-CM

## 2025-04-15 DIAGNOSIS — Z72.51 UNPROTECTED SEXUAL INTERCOURSE: ICD-10-CM

## 2025-04-15 DIAGNOSIS — Z01.419 WELL WOMAN EXAM WITH ROUTINE GYNECOLOGICAL EXAM: Primary | ICD-10-CM

## 2025-04-15 DIAGNOSIS — N76.0 ACUTE VAGINITIS: ICD-10-CM

## 2025-04-15 PROCEDURE — 99395 PREV VISIT EST AGE 18-39: CPT | Performed by: OBSTETRICS & GYNECOLOGY

## 2025-04-15 SDOH — ECONOMIC STABILITY: FOOD INSECURITY: WITHIN THE PAST 12 MONTHS, YOU WORRIED THAT YOUR FOOD WOULD RUN OUT BEFORE YOU GOT MONEY TO BUY MORE.: NEVER TRUE

## 2025-04-15 SDOH — ECONOMIC STABILITY: FOOD INSECURITY: WITHIN THE PAST 12 MONTHS, THE FOOD YOU BOUGHT JUST DIDN'T LAST AND YOU DIDN'T HAVE MONEY TO GET MORE.: NEVER TRUE

## 2025-04-15 ASSESSMENT — PATIENT HEALTH QUESTIONNAIRE - PHQ9
1. LITTLE INTEREST OR PLEASURE IN DOING THINGS: NOT AT ALL
5. POOR APPETITE OR OVEREATING: NOT AT ALL
SUM OF ALL RESPONSES TO PHQ QUESTIONS 1-9: 6
SUM OF ALL RESPONSES TO PHQ QUESTIONS 1-9: 6
7. TROUBLE CONCENTRATING ON THINGS, SUCH AS READING THE NEWSPAPER OR WATCHING TELEVISION: NOT AT ALL
6. FEELING BAD ABOUT YOURSELF - OR THAT YOU ARE A FAILURE OR HAVE LET YOURSELF OR YOUR FAMILY DOWN: NEARLY EVERY DAY
8. MOVING OR SPEAKING SO SLOWLY THAT OTHER PEOPLE COULD HAVE NOTICED. OR THE OPPOSITE, BEING SO FIGETY OR RESTLESS THAT YOU HAVE BEEN MOVING AROUND A LOT MORE THAN USUAL: NOT AT ALL
SUM OF ALL RESPONSES TO PHQ QUESTIONS 1-9: 6
2. FEELING DOWN, DEPRESSED OR HOPELESS: SEVERAL DAYS
3. TROUBLE FALLING OR STAYING ASLEEP: SEVERAL DAYS
10. IF YOU CHECKED OFF ANY PROBLEMS, HOW DIFFICULT HAVE THESE PROBLEMS MADE IT FOR YOU TO DO YOUR WORK, TAKE CARE OF THINGS AT HOME, OR GET ALONG WITH OTHER PEOPLE: NOT DIFFICULT AT ALL
9. THOUGHTS THAT YOU WOULD BE BETTER OFF DEAD, OR OF HURTING YOURSELF: NOT AT ALL
4. FEELING TIRED OR HAVING LITTLE ENERGY: SEVERAL DAYS
SUM OF ALL RESPONSES TO PHQ QUESTIONS 1-9: 6

## 2025-04-15 NOTE — PROGRESS NOTES
SUBJECTIVE:   24 y.o.  female here for annual exam. Pt with regular menses on ocs and no complaints today.     Review of Systems:  General ROS: negative  Psychological ROS: negative  ENT ROS: negative  Endocrine ROS: negative  Respiratory ROS: no cough, shortness of breath, or wheezing  Cardiovascular ROS: no chest pain or dyspnea on exertion  Gastrointestinal ROS: no abdominal pain, change in bowel habits, or black or bloody stools  Genito-Urinary ROS: no dysuria, trouble voiding, or hematuria  Musculoskeletal ROS: negative  Neurological ROS: no TIA or stroke symptoms  Dermatological ROS: negative    OBJECTIVE:   Ht 1.6 m (5' 3\")   Wt 46.7 kg (103 lb)   LMP 2025 (Exact Date)   BMI 18.25 kg/m²     Physical Exam:  CONSTITUTIONAL: She appears well nourished and developed   NEUROLOGIC: Alert and oriented to time, place and person  NECK: no thyroidmegaly  BREASTS: Bilateral breasts without masses, lesions or nipple discharge  LUNGS: Clear to ascultation bilaterally  CVS: regular rate and rhythm  LYMPHATIC: No palpable lymph nodes  ABDOMEN: benign, soft, nontender, no masses. No liver or splenic organomegaly. No evidence of abdominal or inguinal hernia. No indication for occult blood testing  SKIN: normal texture and tone, no lesions  NEURO: normal tone, no hyperreflexia, 1+DTRs throughout    Pelvic Exam:   EFG: normal external genitalia  URETHRAL MEATUS: normal size, no diverticula   URETHRA: normal appearing without diverticula or lesions  BLADDER:  No masses or tenderness  VAGINA: normal rugae, no discharge   CERVIX: parous, no lesions  UTERUS: uterus is normal size, shape, consistency and nontender   ADNEXA: normal adnexa in size, nontender and no masses.  PERINEUM: normal appearing without lesions or masses  RECTUM: no hemorrhoids or rectal masses.     ASSESSMENT:   Routine gynecologic exam    PLAN:   Pap with hpv pending  PT will call for refill of ocs as needed  Past medical, social and family

## 2025-04-17 LAB
HPV HR 12 DNA SPEC QL NAA+PROBE: NOT DETECTED
HPV16 DNA SPEC QL NAA+PROBE: NOT DETECTED
HPV16+18+H RISK 12 DNA SPEC-IMP: NORMAL
HPV18 DNA SPEC QL NAA+PROBE: NOT DETECTED